# Patient Record
Sex: FEMALE | Race: WHITE | Employment: UNEMPLOYED | URBAN - NONMETROPOLITAN AREA
[De-identification: names, ages, dates, MRNs, and addresses within clinical notes are randomized per-mention and may not be internally consistent; named-entity substitution may affect disease eponyms.]

---

## 2022-05-28 ENCOUNTER — HOSPITAL ENCOUNTER (INPATIENT)
Age: 57
LOS: 3 days | Discharge: HOME | DRG: 885 | End: 2022-05-31
Attending: PSYCHIATRY & NEUROLOGY | Admitting: PSYCHIATRY & NEUROLOGY
Payer: MEDICARE

## 2022-05-28 PROBLEM — F34.9 PERSISTENT MOOD (AFFECTIVE) DISORDER, UNSPECIFIED (HCC): Status: ACTIVE | Noted: 2022-05-28

## 2022-05-28 PROBLEM — F23 ACUTE PSYCHOSIS (HCC): Status: ACTIVE | Noted: 2022-05-28

## 2022-05-28 LAB
ALBUMIN SERPL-MCNC: 3.9 G/DL (ref 3.5–5.2)
ALP BLD-CCNC: 104 U/L (ref 35–104)
ALT SERPL-CCNC: 9 U/L (ref 5–33)
ANION GAP SERPL CALCULATED.3IONS-SCNC: 7 MMOL/L (ref 7–19)
AST SERPL-CCNC: 13 U/L (ref 5–32)
BILIRUB SERPL-MCNC: 0.3 MG/DL (ref 0.2–1.2)
BUN BLDV-MCNC: 6 MG/DL (ref 6–20)
CALCIUM SERPL-MCNC: 9.1 MG/DL (ref 8.6–10)
CHLORIDE BLD-SCNC: 96 MMOL/L (ref 98–111)
CO2: 32 MMOL/L (ref 22–29)
CREAT SERPL-MCNC: 0.5 MG/DL (ref 0.5–0.9)
GFR AFRICAN AMERICAN: >59
GFR NON-AFRICAN AMERICAN: >60
GLUCOSE BLD-MCNC: 81 MG/DL (ref 74–109)
POTASSIUM SERPL-SCNC: 4.4 MMOL/L (ref 3.5–5)
SODIUM BLD-SCNC: 135 MMOL/L (ref 136–145)
TOTAL PROTEIN: 6 G/DL (ref 6.6–8.7)

## 2022-05-28 PROCEDURE — 80053 COMPREHEN METABOLIC PANEL: CPT

## 2022-05-28 PROCEDURE — 1240000000 HC EMOTIONAL WELLNESS R&B

## 2022-05-28 PROCEDURE — 6370000000 HC RX 637 (ALT 250 FOR IP): Performed by: FAMILY MEDICINE

## 2022-05-28 PROCEDURE — 6370000000 HC RX 637 (ALT 250 FOR IP): Performed by: PSYCHIATRY & NEUROLOGY

## 2022-05-28 PROCEDURE — 36415 COLL VENOUS BLD VENIPUNCTURE: CPT

## 2022-05-28 PROCEDURE — 90792 PSYCH DIAG EVAL W/MED SRVCS: CPT | Performed by: PSYCHIATRY & NEUROLOGY

## 2022-05-28 RX ORDER — LOSARTAN POTASSIUM 50 MG/1
50 TABLET ORAL ONCE
Status: COMPLETED | OUTPATIENT
Start: 2022-05-28 | End: 2022-05-28

## 2022-05-28 RX ORDER — LOSARTAN POTASSIUM 50 MG/1
50 TABLET ORAL DAILY
Status: DISCONTINUED | OUTPATIENT
Start: 2022-05-29 | End: 2022-05-29

## 2022-05-28 RX ORDER — LORATADINE 10 MG/1
10 TABLET ORAL DAILY
Status: ON HOLD | COMMUNITY
End: 2022-05-31 | Stop reason: HOSPADM

## 2022-05-28 RX ORDER — ACETAMINOPHEN 325 MG/1
650 TABLET ORAL EVERY 4 HOURS PRN
Status: DISCONTINUED | OUTPATIENT
Start: 2022-05-28 | End: 2022-05-31 | Stop reason: HOSPADM

## 2022-05-28 RX ORDER — TRAZODONE HYDROCHLORIDE 100 MG/1
100 TABLET ORAL NIGHTLY
Status: ON HOLD | COMMUNITY
End: 2022-05-31 | Stop reason: HOSPADM

## 2022-05-28 RX ORDER — CLONIDINE HYDROCHLORIDE 0.1 MG/1
0.1 TABLET ORAL 3 TIMES DAILY PRN
Status: DISCONTINUED | OUTPATIENT
Start: 2022-05-28 | End: 2022-05-31 | Stop reason: HOSPADM

## 2022-05-28 RX ORDER — FLUOXETINE HYDROCHLORIDE 40 MG/1
80 CAPSULE ORAL DAILY
Status: ON HOLD | COMMUNITY
End: 2022-05-31 | Stop reason: HOSPADM

## 2022-05-28 RX ORDER — QUETIAPINE FUMARATE 25 MG/1
50 TABLET, FILM COATED ORAL NIGHTLY
Status: ON HOLD | COMMUNITY
End: 2022-05-31 | Stop reason: HOSPADM

## 2022-05-28 RX ORDER — LOSARTAN POTASSIUM 100 MG/1
100 TABLET ORAL DAILY
Status: DISCONTINUED | OUTPATIENT
Start: 2022-05-28 | End: 2022-05-28

## 2022-05-28 RX ORDER — DICYCLOMINE HCL 20 MG
20 TABLET ORAL 3 TIMES DAILY PRN
Status: ON HOLD | COMMUNITY
End: 2022-05-31 | Stop reason: HOSPADM

## 2022-05-28 RX ORDER — QUETIAPINE FUMARATE 25 MG/1
25 TABLET, FILM COATED ORAL 3 TIMES DAILY
Status: ON HOLD | COMMUNITY
End: 2022-05-31 | Stop reason: HOSPADM

## 2022-05-28 RX ORDER — ERGOCALCIFEROL 1.25 MG/1
50000 CAPSULE ORAL WEEKLY
Status: ON HOLD | COMMUNITY
End: 2022-05-31 | Stop reason: HOSPADM

## 2022-05-28 RX ORDER — TRAZODONE HYDROCHLORIDE 50 MG/1
50 TABLET ORAL NIGHTLY
Status: DISCONTINUED | OUTPATIENT
Start: 2022-05-28 | End: 2022-05-31 | Stop reason: HOSPADM

## 2022-05-28 RX ORDER — OMEPRAZOLE 20 MG/1
20 CAPSULE, DELAYED RELEASE ORAL DAILY
Status: ON HOLD | COMMUNITY
End: 2022-05-31 | Stop reason: HOSPADM

## 2022-05-28 RX ORDER — HYDROXYZINE HYDROCHLORIDE 25 MG/1
25 TABLET, FILM COATED ORAL 3 TIMES DAILY PRN
Status: DISCONTINUED | OUTPATIENT
Start: 2022-05-28 | End: 2022-05-31 | Stop reason: HOSPADM

## 2022-05-28 RX ORDER — POLYETHYLENE GLYCOL 3350 17 G/17G
17 POWDER, FOR SOLUTION ORAL DAILY PRN
Status: DISCONTINUED | OUTPATIENT
Start: 2022-05-28 | End: 2022-05-31 | Stop reason: HOSPADM

## 2022-05-28 RX ORDER — LOSARTAN POTASSIUM 100 MG/1
50 TABLET ORAL DAILY
Status: ON HOLD | COMMUNITY
End: 2022-05-31 | Stop reason: HOSPADM

## 2022-05-28 RX ORDER — PANTOPRAZOLE SODIUM 40 MG/1
40 TABLET, DELAYED RELEASE ORAL
Status: DISCONTINUED | OUTPATIENT
Start: 2022-05-29 | End: 2022-05-31 | Stop reason: HOSPADM

## 2022-05-28 RX ORDER — NICOTINE 21 MG/24HR
1 PATCH, TRANSDERMAL 24 HOURS TRANSDERMAL DAILY
Status: DISCONTINUED | OUTPATIENT
Start: 2022-05-28 | End: 2022-05-31 | Stop reason: HOSPADM

## 2022-05-28 RX ORDER — SULFASALAZINE 500 MG/1
500 TABLET ORAL 2 TIMES DAILY
Status: ON HOLD | COMMUNITY
End: 2022-05-31 | Stop reason: HOSPADM

## 2022-05-28 RX ADMIN — TRAZODONE HYDROCHLORIDE 50 MG: 50 TABLET ORAL at 21:01

## 2022-05-28 RX ADMIN — ACETAMINOPHEN 650 MG: 325 TABLET ORAL at 21:01

## 2022-05-28 RX ADMIN — HYDROXYZINE HYDROCHLORIDE 25 MG: 25 TABLET, FILM COATED ORAL at 21:01

## 2022-05-28 RX ADMIN — DIVALPROEX SODIUM 750 MG: 500 TABLET, EXTENDED RELEASE ORAL at 21:01

## 2022-05-28 RX ADMIN — LOSARTAN POTASSIUM 100 MG: 100 TABLET, FILM COATED ORAL at 14:50

## 2022-05-28 RX ADMIN — HYDROXYZINE HYDROCHLORIDE 25 MG: 25 TABLET, FILM COATED ORAL at 12:45

## 2022-05-28 RX ADMIN — ACETAMINOPHEN 650 MG: 325 TABLET ORAL at 12:45

## 2022-05-28 RX ADMIN — CLONIDINE HYDROCHLORIDE 0.1 MG: 0.1 TABLET ORAL at 16:48

## 2022-05-28 RX ADMIN — LOSARTAN POTASSIUM 50 MG: 50 TABLET, FILM COATED ORAL at 21:01

## 2022-05-28 ASSESSMENT — PAIN DESCRIPTION - ONSET
ONSET: ON-GOING

## 2022-05-28 ASSESSMENT — PAIN DESCRIPTION - DESCRIPTORS
DESCRIPTORS: ACHING;DISCOMFORT;NAGGING
DESCRIPTORS: ACHING;DISCOMFORT
DESCRIPTORS: ACHING;DISCOMFORT
DESCRIPTORS: ACHING

## 2022-05-28 ASSESSMENT — PAIN DESCRIPTION - PAIN TYPE
TYPE: CHRONIC PAIN
TYPE: CHRONIC PAIN;ACUTE PAIN
TYPE: CHRONIC PAIN

## 2022-05-28 ASSESSMENT — PAIN SCALES - GENERAL
PAINLEVEL_OUTOF10: 10
PAINLEVEL_OUTOF10: 10
PAINLEVEL_OUTOF10: 8
PAINLEVEL_OUTOF10: 5
PAINLEVEL_OUTOF10: 10

## 2022-05-28 ASSESSMENT — PAIN DESCRIPTION - ORIENTATION
ORIENTATION: OTHER (COMMENT)
ORIENTATION: OTHER (COMMENT)

## 2022-05-28 ASSESSMENT — PAIN DESCRIPTION - LOCATION
LOCATION: OTHER (COMMENT)
LOCATION: OTHER (COMMENT)
LOCATION: GENERALIZED
LOCATION: OTHER (COMMENT)

## 2022-05-28 ASSESSMENT — PAIN - FUNCTIONAL ASSESSMENT
PAIN_FUNCTIONAL_ASSESSMENT: ACTIVITIES ARE NOT PREVENTED
PAIN_FUNCTIONAL_ASSESSMENT: ACTIVITIES ARE NOT PREVENTED
PAIN_FUNCTIONAL_ASSESSMENT: PREVENTS OR INTERFERES SOME ACTIVE ACTIVITIES AND ADLS
PAIN_FUNCTIONAL_ASSESSMENT: ACTIVITIES ARE NOT PREVENTED

## 2022-05-28 ASSESSMENT — PAIN DESCRIPTION - FREQUENCY
FREQUENCY: CONTINUOUS

## 2022-05-28 ASSESSMENT — PATIENT HEALTH QUESTIONNAIRE - PHQ9: SUM OF ALL RESPONSES TO PHQ QUESTIONS 1-9: 14

## 2022-05-28 ASSESSMENT — SLEEP AND FATIGUE QUESTIONNAIRES
DO YOU USE A SLEEP AID: YES
AVERAGE NUMBER OF SLEEP HOURS: 8
SLEEP PATTERN: RESTFUL
DO YOU HAVE DIFFICULTY SLEEPING: NO

## 2022-05-28 NOTE — H&P
History and Physical      CHIEF COMPLAINT:  Depression    Reason for Admission:  Depression    History Obtained From:  Patient, chart    HISTORY OF PRESENT ILLNESS:      The patient is a 61 y.o. female who was admitted to [facility] with worsening mood issues. She has no c/o CP or SOA. No abdominal pain or N/V. No dysuria. No new pain complaints. No fevers. No HA or dizziness. Past Medical History:    No past medical history on file. Past Surgical History:    No past surgical history on file. Medications Prior to Admission:    No medications prior to admission. Allergies:  Patient has no known allergies. Social History:   TOBACCO:   has no history on file for tobacco use. ETOH:   has no history on file for alcohol use. DRUGS:   has no history on file for drug use. MARITAL STATUS: not   OCCUPATION:  Not working  Patient currently lives with BF      Family History:   No family history on file. REVIEW OF SYSTEMS:  Constitutional: neg  CV: neg  Pulmonary: neg  GI: neg  : neg  Psych: depression  Neuro: neg  Skin: neg  MusculoSkeletal: neg  HEENT: neg  Joints: neg  Vitals:  BP (!) 143/105   Pulse 78   Temp 97.3 °F (36.3 °C) (Temporal)   Resp 18   SpO2 91%     PHYSICAL EXAM:  Gen: NAD, alert, pleasant  HEENT: WNL  Lymph: no LAD  Neck: no JVD or masses  Chest: CTA bilat  CV: RRR  Abdomen: NT/ND  Extrem: no C/C/E  Neuro: non focal  Skin: no rashes  Joints: no redness  DATA:  I have reviewed the admission labs and imaging tests.     ASSESSMENT AND PLAN:      Principal Problem:    Persistent mood (affective) disorder, unspecified---follow with Psych    COPD---supportive care    HTN---follow BP        Ced Cortez MD  1:01 PM 5/28/2022

## 2022-05-28 NOTE — PROGRESS NOTES
[facility] Admission Note  Nursing Admission Note        Reason for Admission: Pt is a 61year old white female who was directly admitted to [address] from [facility]. Pt reported visual hallucinations of  sister (but then reports sister is not actually ) and \"thought she was crazy\". Pt confused of time and place. Pt has a history of MDD, PTSD and cannabis use. Patient Active Problem List   Diagnosis    Acute psychosis ([facility] [address]    Persistent mood (affective) disorder, unspecified (HCC)         Addictive Behavior:   Addictive Behavior  In the Past 3 Months, Have You Felt or Has Someone Told You That You Have a Problem With  : None    Medical Problems:   No past medical history on file. Status EXAM:  Mental Status and Behavioral Exam  Normal: No  Level of Assistance: Independent/Self  Facial Expression: Exaggerated,Elevated  Affect: Congruent  Level of Consciousness: Alert  Frequency of Checks: 4 times per hour, close  Mood:Normal: No  Mood: Depressed,Anxious,Helpless,Worthless, low self-esteem  Motor Activity:Normal: Yes  Eye Contact: Fair  Observed Behavior: Cooperative,Tearful,Friendly  Sexual Misconduct History: Current - no  Preception: [place] to person,[place] to time,[place] to place,[place] to situation  Attention:Normal: No  Attention: Distractible  Thought Processes: Circumstantial  Thought Content:Normal: Yes  Depression Symptoms: Feelings of hopelessess,Feelings of worthlessness,Feelings of helplessness,Impaired concentration,Change in energy level,Loss of interest (rates depression 10)  Anxiety Symptoms: Generalized (rates anxiety 10)  Lizeth Symptoms: No problems reported or observed.   Hallucinations: None  Delusions: Yes  Delusions: Paranoid  Memory:Normal: Yes  Insight and Judgment: No  Insight and Judgment:  (limited insight and judgment)      Metabolic Screening:    No results found for: LABA1C  No results found for: CHOL  No results found for: TRIG  No results found for: HDL  No components found for: LDLCAL  No results found for: LABVLDL    There is no height or weight on file to calculate BMI. BP Readings from Last 2 Encounters:   05/28/22 (!) 143/105       PATIENT STRENGTHS:       Patient Strengths and Limitations:         Tobacco Screening:  Practical Counseling, on admission, doyle X, if applicable and completed (first 3 are required if patient doesn't refuse):            Recognizing danger situations (included triggers and roadblocks)                 Coping skills (new ways to manage stress, exercise, relaxation techniques, changing routine, distraction                                                      Basic information about quitting (benefits of quitting, techniques in how to quit, available resources   Referral for counseling [facility]ed to [facility]                                            Patient refused counseling yes  Patient has not smoked in the last 30 days   Patient offered nicotine patch. Received yes  Refused   Patient is a non-smoker                                   Admission to Unit:    Pt admitted to [facility] under the care of Dr. Bria Jackson,  arrived on unit via stretcher with security and EMS from [facility]. Patient arrived dressed in paper scrubs:  no.    Body assessment and safety check completed by Jarred John RN and Taiwo Pisano RN and  no contraband discovered. No belongings or valuables. Admission completed by Jarred John RN. Oriented to unit, unit policy and expectations:  yes    Reviewed and explained all legal documents:  yes    Education for Fall Prevention and Restraints given: yes    Patient signed all legal documents voluntary signed   Pt verbalizes understanding:yes     Viraj Madrigal Obtained? yes    Medical Bed:  Does patient require a medical bed? yes  If answered yes for medical bed use, does the patient have the following conditions? High risk for falls? yes   Obstructive sleep apnea? no   Oxygen Use? no   Use of assistive devices? no   Other, (explain)? COPD      Identifies stressors. yes   Family relationship . Protective Factors:  Patient identifies protective factors with nursing staff as follows: Identifies reasons for living: Yes   Supportive Social Network or family: Yes    Belief that suicide is immoral/high spirituality: Yes   Responsibility to family or others/living with family: Yes   Fear of death or dying due to pain and suffering: No   Engaged in work or school: No     If Patient is unable to identify, reason why? COVID TEACHING:   Nursing provided education regarding COVID for social distancing, wearing masks, washing hands, and reporting any symptoms: yes  Mask Provided: yes If patient refused, reason: pt's on unit have negative COVID test prior to admission. Admission Note:    Pt arrived on unit via stretcher with EMS and security. Direct admission from [facility] [employer]. Oriented to unit. Will continue to monitor.      Electronically signed by Guille Maria RN on 5/28/22 at 2:47 PM CDT

## 2022-05-28 NOTE — H&P
Department of Psychiatry  Attending History and Physical        CHIEF COMPLAINT:  \"I've been doing terrible\"    History obtained from: patient, chart    HISTORY OF PRESENT ILLNESS:    62 yo white female brought in from [facility] with SI. New to our service. Patient is visibly distressed when seen today. She is tearful. Her speech is loud and pressured. States she has been severely depressed due to her social stressors. Reports severe mood swings and racing thoughts. States her yadiel has been calling her crazy. She denies suicidal ideation at this time. She reports poor sleep. She denies hallucinations and paranoia. She is open to medication adjustment. PSYCHIATRIC HISTORY:    Diagnoses: Depression  Suicide attempts/gestures: Denies   Prior hospitalizations: several  Medication trials: Unable to recall  Mental health contact: Lost to follow-up   Head trauma: Denies    SUBSTANCE USE HISTORY:  Drinks socially. Smokes cannabis daily. Smokes cigarettes. Past Medical History:    No past medical history on file. Past Surgical History:    No past surgical history on file. Medications Prior to Admission:   Prior to Admission medications    Not on File       Allergies:  Patient has no allergy information on record. Social History:  Lives with sherlyn. Family History:   No family history on file. No history of psychiatric illness or suicide attempts. REVIEW OF SYSTEMS:  General: No fevers, chills, night sweats, no recent weight loss or gain. Head: No headache, no migraine. Eyes: No recent visual changes. Ears: No recent hearing changes. Nose: No increased congestion or change in sense of smell. Throat: No sore throat, no trouble swallowing. Cardiovascular: No chest pain or palpitations, or dizziness. Respiratory: No cough, wheezes, congestion, or shortness of breath. Gastrointestinal: No abdominal pain, nausea or vomiting, no diarrhea or constipation.    Musculo-skeletal: No edema, deformities, or loss of functions. Neurological: No loss of consciousness, abnormal sensations, or weakness. Skin: No rash, abrasions or bruises. PHYSICAL EXAM:  GENERAL APPEARANCE: 61y.o. year-old female in NAD   HEAD: Normocephalic, atraumatic. THROAT: No erythema, exudates, lesions. No tongue laceration. CARDIOVASCULAR: PMI nondisplaced. Regular rhythm and rate. Normal S1 and S2.  PULMONARY: Clear to auscultation bilaterally, no tenderness to palpation. ABDOMEN: Soft, nontender, nondistended. MUSCULOSKELTAL: No obvious deformities, clubbing, cyanosis or edema, no spinous process or paraspinous tenderness, normal ROM, distal pulses intact symmetric 2+ bilaterally. NEUROLOGICAL: Alert, oriented x 3, CN II-XII grossly intact, motor strength 5/5 all muscle groups, DTR 2+ intact and symmetric, sensation intact to sharp and dull. No abnormal movements or tremors. SKIN: Warm, dry, intact, no rash, abrasions bruises     Vitals:  BP (!) 143/105   Pulse 78   Temp 97.3 °F (36.3 °C) (Temporal)   Resp 18   SpO2 91%     Mental Status Examination:    Appearance: Stated age. Disheveled. Gait stable. No abnormal movements or tremor. Behavior: Tearful, restless  Speech: Pressured  Mood: \"I feel crazy \"   Affect: Labile   Thought Process: Appears linear. Thought Content: Denies SI/HI. No overt delusions or paranoia appreciated. Perceptions: Denies auditory or visual hallucinations at present time. Not responding to internal stimuli. Concentration: Intact. Orientation: to person, place, date, and situation. Language: Intact. Fund of information: Intact. Memory: Recent and remote appear intact. Neurovegitative: Poor appetite and sleep. Insight: Impaired. Judgment: Impaired.     DATA:  No results found for: WBC, HGB, HCT, MCV, PLT  No results found for: NA, K, CL, CO2, BUN, CREATININE, GLUCOSE, CALCIUM, PROT, LABALBU, BILITOT, ALKPHOS, AST, ALT, LABGLOM, GFRAA, AGRATIO, GLOB        ASSESSMENT AND PLAN:  DSM-5 DIAGNOSIS:   Impression:  Mood disorder unspecified  Anxiety unspecified  Insomnia unspecified  Cannabis use severe  Tobacco use disorder    Patient is meeting the criteria for inpatient psychiatric treatment. Plan:   -Admit to [facility] Unit and monitor on 15 minute checks. Suicide and fall precautions.  Yara Hutchinson reviewed. -Gather collateral information from family with release.  -Medical monitoring to be performed by Dr. Jesús Beck and associates. Order routine labs. -Acclimate to the unit. Provide supportive psychotherapy.  -Encourage participation in groups and therapeutic activities as appropriate. Work on coping skills. -Medications:    A trial of Depakote for mood stabilization. Trazodone for sleep.    Offer nicotine patch to help with nicotine withdrawal.    -The risks, benefits, side effects, indications, contraindications, and adverse effects of the medications have been discussed.  -The patient has verbalized understanding and has capacity to give informed consent.  -SW help evaluate home environment and provide outpatient resources.  -Discuss with treatment team.

## 2022-05-28 NOTE — PROGRESS NOTES
Behavioral Services  Medicare Certification Upon Admission    I certify that this patient's inpatient psychiatric hospital admission is medically necessary for:    [x] (1) Treatment which could reasonably be expected to improve this patient's condition,       [] (2) Or for diagnostic study;     AND     [x](2) The inpatient psychiatric services are provided while the individual is under the care of a physician and are included in the individualized plan of care.     Estimated length of stay/service 3-5 days  Plan for post-hospital care TBA    Electronically signed by Nirmal Reddy MD on 5/28/2022 at 11:06 AM

## 2022-05-29 LAB
ALBUMIN SERPL-MCNC: 4.1 G/DL (ref 3.5–5.2)
ALP BLD-CCNC: 107 U/L (ref 35–104)
ALT SERPL-CCNC: 9 U/L (ref 5–33)
ANION GAP SERPL CALCULATED.3IONS-SCNC: 10 MMOL/L (ref 7–19)
AST SERPL-CCNC: 12 U/L (ref 5–32)
BASOPHILS ABSOLUTE: 0.1 K/UL (ref 0–0.2)
BASOPHILS RELATIVE PERCENT: 1.4 % (ref 0–1)
BILIRUB SERPL-MCNC: 0.4 MG/DL (ref 0.2–1.2)
BUN BLDV-MCNC: 5 MG/DL (ref 6–20)
CALCIUM SERPL-MCNC: 9.3 MG/DL (ref 8.6–10)
CHLORIDE BLD-SCNC: 97 MMOL/L (ref 98–111)
CHOLESTEROL, TOTAL: 187 MG/DL (ref 160–199)
CO2: 29 MMOL/L (ref 22–29)
CREAT SERPL-MCNC: 0.5 MG/DL (ref 0.5–0.9)
EOSINOPHILS ABSOLUTE: 0.4 K/UL (ref 0–0.6)
EOSINOPHILS RELATIVE PERCENT: 6.4 % (ref 0–5)
GFR AFRICAN AMERICAN: >59
GFR NON-AFRICAN AMERICAN: >60
GLUCOSE BLD-MCNC: 95 MG/DL (ref 74–109)
HBA1C MFR BLD: 5.7 % (ref 4–6)
HCT VFR BLD CALC: 50.1 % (ref 37–47)
HDLC SERPL-MCNC: 51 MG/DL (ref 65–121)
HEMOGLOBIN: 16 G/DL (ref 12–16)
IMMATURE GRANULOCYTES #: 0 K/UL
LDL CHOLESTEROL CALCULATED: 118 MG/DL
LYMPHOCYTES ABSOLUTE: 1.4 K/UL (ref 1.1–4.5)
LYMPHOCYTES RELATIVE PERCENT: 23.5 % (ref 20–40)
MCH RBC QN AUTO: 28.1 PG (ref 27–31)
MCHC RBC AUTO-ENTMCNC: 31.9 G/DL (ref 33–37)
MCV RBC AUTO: 87.9 FL (ref 81–99)
MONOCYTES ABSOLUTE: 0.6 K/UL (ref 0–0.9)
MONOCYTES RELATIVE PERCENT: 10.2 % (ref 0–10)
NEUTROPHILS ABSOLUTE: 3.4 K/UL (ref 1.5–7.5)
NEUTROPHILS RELATIVE PERCENT: 58 % (ref 50–65)
PDW BLD-RTO: 14.6 % (ref 11.5–14.5)
PLATELET # BLD: 416 K/UL (ref 130–400)
PMV BLD AUTO: 10.1 FL (ref 9.4–12.3)
POTASSIUM SERPL-SCNC: 4.7 MMOL/L (ref 3.5–5)
RBC # BLD: 5.7 M/UL (ref 4.2–5.4)
SODIUM BLD-SCNC: 136 MMOL/L (ref 136–145)
TOTAL PROTEIN: 6.2 G/DL (ref 6.6–8.7)
TRIGL SERPL-MCNC: 88 MG/DL (ref 0–149)
TSH REFLEX FT4: 1.63 UIU/ML (ref 0.35–5.5)
VITAMIN B-12: 493 PG/ML (ref 211–946)
VITAMIN D 25-HYDROXY: 41.9 NG/ML
WBC # BLD: 5.8 K/UL (ref 4.8–10.8)

## 2022-05-29 PROCEDURE — 80061 LIPID PANEL: CPT

## 2022-05-29 PROCEDURE — 82306 VITAMIN D 25 HYDROXY: CPT

## 2022-05-29 PROCEDURE — 6360000002 HC RX W HCPCS: Performed by: PSYCHIATRY & NEUROLOGY

## 2022-05-29 PROCEDURE — 83036 HEMOGLOBIN GLYCOSYLATED A1C: CPT

## 2022-05-29 PROCEDURE — 80053 COMPREHEN METABOLIC PANEL: CPT

## 2022-05-29 PROCEDURE — 1240000000 HC EMOTIONAL WELLNESS R&B

## 2022-05-29 PROCEDURE — 84443 ASSAY THYROID STIM HORMONE: CPT

## 2022-05-29 PROCEDURE — 36415 COLL VENOUS BLD VENIPUNCTURE: CPT

## 2022-05-29 PROCEDURE — 85025 COMPLETE CBC W/AUTO DIFF WBC: CPT

## 2022-05-29 PROCEDURE — 6370000000 HC RX 637 (ALT 250 FOR IP): Performed by: PSYCHIATRY & NEUROLOGY

## 2022-05-29 PROCEDURE — 82607 VITAMIN B-12: CPT

## 2022-05-29 PROCEDURE — 6370000000 HC RX 637 (ALT 250 FOR IP): Performed by: FAMILY MEDICINE

## 2022-05-29 PROCEDURE — 94640 AIRWAY INHALATION TREATMENT: CPT

## 2022-05-29 RX ORDER — ALBUTEROL SULFATE 2.5 MG/3ML
2.5 SOLUTION RESPIRATORY (INHALATION) 4 TIMES DAILY PRN
Status: DISCONTINUED | OUTPATIENT
Start: 2022-05-29 | End: 2022-05-31 | Stop reason: HOSPADM

## 2022-05-29 RX ORDER — AMLODIPINE BESYLATE 5 MG/1
2.5 TABLET ORAL DAILY
Status: DISCONTINUED | OUTPATIENT
Start: 2022-05-29 | End: 2022-05-31 | Stop reason: HOSPADM

## 2022-05-29 RX ORDER — LOSARTAN POTASSIUM 100 MG/1
100 TABLET ORAL DAILY
Status: DISCONTINUED | OUTPATIENT
Start: 2022-05-30 | End: 2022-05-31 | Stop reason: HOSPADM

## 2022-05-29 RX ADMIN — DIVALPROEX SODIUM 750 MG: 500 TABLET, EXTENDED RELEASE ORAL at 20:35

## 2022-05-29 RX ADMIN — LOSARTAN POTASSIUM 50 MG: 50 TABLET, FILM COATED ORAL at 08:15

## 2022-05-29 RX ADMIN — ACETAMINOPHEN 650 MG: 325 TABLET ORAL at 20:34

## 2022-05-29 RX ADMIN — AMLODIPINE BESYLATE 2.5 MG: 5 TABLET ORAL at 12:10

## 2022-05-29 RX ADMIN — CLONIDINE HYDROCHLORIDE 0.1 MG: 0.1 TABLET ORAL at 09:29

## 2022-05-29 RX ADMIN — PANTOPRAZOLE SODIUM 40 MG: 40 TABLET, DELAYED RELEASE ORAL at 05:48

## 2022-05-29 RX ADMIN — ALBUTEROL SULFATE 2.5 MG: 2.5 SOLUTION RESPIRATORY (INHALATION) at 18:30

## 2022-05-29 RX ADMIN — CLONIDINE HYDROCHLORIDE 0.1 MG: 0.1 TABLET ORAL at 17:45

## 2022-05-29 RX ADMIN — ACETAMINOPHEN 650 MG: 325 TABLET ORAL at 06:36

## 2022-05-29 RX ADMIN — HYDROXYZINE HYDROCHLORIDE 25 MG: 25 TABLET, FILM COATED ORAL at 20:35

## 2022-05-29 RX ADMIN — TRAZODONE HYDROCHLORIDE 50 MG: 50 TABLET ORAL at 20:34

## 2022-05-29 RX ADMIN — CLONIDINE HYDROCHLORIDE 0.1 MG: 0.1 TABLET ORAL at 05:57

## 2022-05-29 ASSESSMENT — PAIN DESCRIPTION - ONSET
ONSET: ON-GOING

## 2022-05-29 ASSESSMENT — PAIN DESCRIPTION - DESCRIPTORS
DESCRIPTORS: ACHING;DISCOMFORT
DESCRIPTORS: ACHING;DISCOMFORT
DESCRIPTORS: ACHING;THROBBING
DESCRIPTORS: ACHING;DISCOMFORT

## 2022-05-29 ASSESSMENT — PAIN DESCRIPTION - FREQUENCY
FREQUENCY: CONTINUOUS

## 2022-05-29 ASSESSMENT — PAIN DESCRIPTION - PAIN TYPE
TYPE: CHRONIC PAIN

## 2022-05-29 ASSESSMENT — PAIN DESCRIPTION - LOCATION
LOCATION: OTHER (COMMENT)
LOCATION: BACK;OTHER (COMMENT)
LOCATION: HEAD;OTHER (COMMENT)
LOCATION: OTHER (COMMENT)

## 2022-05-29 ASSESSMENT — PAIN DESCRIPTION - ORIENTATION
ORIENTATION: OTHER (COMMENT)
ORIENTATION: LOWER

## 2022-05-29 ASSESSMENT — PAIN SCALES - GENERAL
PAINLEVEL_OUTOF10: 10
PAINLEVEL_OUTOF10: 10
PAINLEVEL_OUTOF10: 7

## 2022-05-29 NOTE — PLAN OF CARE
Problem: Self Harm/Suicidality  Goal: Will have no self-injury during hospital stay  Description: INTERVENTIONS:  1. Q 30 MINUTES: Routine safety checks  2. Q SHIFT & PRN: Assess risk to determine if routine checks are adequate to maintain patient safety  5/29/2022 0247 by Alan Hebert RN  Outcome: Progressing  5/29/2022 0110 by Alan Hebert RN  Outcome: Progressing     Problem: Depression  Goal: Will be euthymic at discharge  Description: INTERVENTIONS:  1. Administer medication as ordered  2. Provide emotional support via 1:1 interaction with staff  3. Encourage involvement in milieu/groups/activities  4. Monitor for social isolation  5/29/2022 0247 by Alan Hebert RN  Outcome: Progressing  5/29/2022 0110 by Alan Hebert RN  Outcome: Progressing     Problem: Psychosis  Goal: Will report no hallucinations or delusions  Description: INTERVENTIONS:  1. Administer medication as  ordered  2. Assist with reality testing to support increasing orientation  3. Assess if patient's hallucinations or delusions are encouraging self harm or harm to others and intervene as appropriate  5/29/2022 0247 by Alan Hebert RN  Outcome: Progressing  5/29/2022 0110 by Alan Hebert RN  Outcome: Progressing     Problem: Behavior  Goal: Pt/Family maintain appropriate behavior and adhere to behavioral management agreement, if implemented  Description: INTERVENTIONS:  1. Assess patient/family's coping skills and  non-compliant behavior (including use of illegal substances)  2. Notify security of behavior or suspected illegal substances which indicate the need for search of the patient and/or belongings  3. Encourage verbalization of thoughts and concerns in a socially appropriate manner  4. Utilize positive, consistent limit setting strategies supporting safety of patient, staff and others  5. Encourage participation in the decision making process about the behavioral management agreement  6.  Implement a Health Care Agreement if patient meets criteria  7. If a patient's behavior jeopardizes the safety of the patient, staff, or others refer to organization policy. If a visitor's behavior poses a threat to safety call refer to organization policy. 8. Initiate consult with , Psychosocial CNS, Spiritual Care as appropriate  5/29/2022 0247 by Quoc Candelaria RN  Outcome: Progressing  5/29/2022 0110 by Quoc Candelaria RN  Outcome: Progressing  Flowsheets (Taken 5/28/2022 1416 by Lacey Giraldo RN)  Patient/family maintains appropriate behavior and adheres to behavioral management agreement, if implemented:   Assess patient/familys coping skills and  non-compliant behavior (including use of illegal substances)   Encourage participation in the decision making process about the behavioral management agreement     Problem: Anxiety  Goal: Will report anxiety at manageable levels  Description: INTERVENTIONS:  1. Administer medication as ordered  2. Teach and rehearse alternative coping skills  3. Provide emotional support with 1:1 interaction with staff  5/29/2022 0247 by Quoc Candelaria RN  Outcome: Progressing  5/29/2022 0110 by Quoc Candelaria RN  Outcome: Progressing  Flowsheets (Taken 5/28/2022 1416 by Lacey Giraldo RN)  Will report anxiety at manageable levels:   Administer medication as ordered   Provide emotional support with 1:1 interaction with staff   Teach and rehearse alternative coping skills     Problem: Sleep Disturbance  Goal: Will exhibit normal sleeping pattern  Description: INTERVENTIONS:  1. Administer medication as ordered  2. Decrease environmental stimuli, including noise, as appropriate  3. Discourage social isolation and naps during the day  5/29/2022 0247 by Quoc Candelaria RN  Outcome: Progressing  5/29/2022 0110 by Quoc Candelaria RN  Outcome: Progressing     Problem: Self Care Deficit  Goal: Return ADL status to a safe level of function  Description: INTERVENTIONS:  1.  Administer medication as ordered  2. Assess ADL deficits and provide assistive devices as needed  3. Obtain PT/OT consults as needed  4.  Assist and instruct patient to increase activity and self care as tolerated  5/29/2022 0247 by Viri Ball RN  Outcome: Progressing  5/29/2022 0110 by Viri Ball RN  Outcome: Progressing     Problem: Pain  Goal: Verbalizes/displays adequate comfort level or baseline comfort level  5/29/2022 0247 by Viri Ball RN  Outcome: Progressing  5/29/2022 0110 by Viri Ball RN  Outcome: Progressing     Problem: Safety - Adult  Goal: Free from fall injury  5/29/2022 0247 by Viri Ball RN  Outcome: Progressing  5/29/2022 0110 by Viri Ball RN  Outcome: Progressing

## 2022-05-29 NOTE — PSYCHOTHERAPY
WRAP UP GROUP NOTE    Patient's Goal:  Providing feedback as to their own progress in the care-plan provided. Patients have an opportunity to explore self-reflective skills and share any additional cares and concerns not yet addressed. Pt effectively participated.     Energy Level:low  Appetite:poor  Concentration:poor  Hallucinations:\"no\"  Depression:worse  Anxiety:on/off  How I worked today:blank  What helps me sleep:\"meds\"  Any questions/complaints/comments:\"no\"    Group/activities that helped me today were:    Agnieszka    Assisted by; Tea Eli RN    Electronically signed by Kermit Sandifer, RN on 5/29/2022 at 2:20 AM

## 2022-05-29 NOTE — PLAN OF CARE
setting strategies supporting safety of patient, staff and others  5. Encourage participation in the decision making process about the behavioral management agreement  6. Implement a Health Care Agreement if patient meets criteria  7. If a patient's behavior jeopardizes the safety of the patient, staff, or others refer to organization policy. If a visitor's behavior poses a threat to safety call refer to organization policy. 8. Initiate consult with , Psychosocial CNS, Spiritual Care as appropriate  5/29/2022 0954 by Corona Humphreys RN  Outcome: Progressing  5/29/2022 [address] by Kevan Higginbotham RN  Outcome: Progressing  5/29/2022 0110 by Kevan Higginbotham RN  Outcome: Progressing  Flowsheets (Taken 5/28/2022 1416 by Corona Humphreys RN)  Patient/family maintains appropriate behavior and adheres to behavioral management agreement, if implemented:   Assess patient/familys coping skills and  non-compliant behavior (including use of illegal substances)   Encourage participation in the decision making process about the behavioral management agreement     Problem: Anxiety  Goal: Will report anxiety at manageable levels  Description: INTERVENTIONS:  1. Administer medication as ordered  2. Teach and rehearse alternative coping skills  3. Provide emotional support with 1:1 interaction with staff  5/29/2022 0954 by Corona Humphreys RN  Outcome: Progressing  5/29/2022 0247 by Kevan Higginbotham RN  Outcome: Progressing  5/29/2022 0110 by Kevan Higginbotham RN  Outcome: Progressing  Flowsheets (Taken 5/28/2022 1416 by Corona Humphreys RN)  Will report anxiety at manageable levels:   Administer medication as ordered   Provide emotional support with 1:1 interaction with staff   Teach and rehearse alternative coping skills     Problem: Sleep Disturbance  Goal: Will exhibit normal sleeping pattern  Description: INTERVENTIONS:  1. Administer medication as ordered  2.  Decrease environmental stimuli, including noise, as appropriate  3. Discourage social isolation and naps during the day  5/29/2022 0954 by Jae Musa RN  Outcome: Progressing  5/29/2022 0247 by Niranjan Flores RN  Outcome: Progressing  5/29/2022 0110 by Niranjan Flores RN  Outcome: Progressing     Problem: Self Care Deficit  Goal: Return ADL status to a safe level of function  Description: INTERVENTIONS:  1. Administer medication as ordered  2. Assess ADL deficits and provide assistive devices as needed  3. Obtain PT/OT consults as needed  4.  Assist and instruct patient to increase activity and self care as tolerated  5/29/2022 0954 by Jae Musa RN  Outcome: Progressing  5/29/2022 0247 by Niranjan Flores RN  Outcome: Progressing  5/29/2022 0110 by Niranjan Flores RN  Outcome: Progressing     Problem: Pain  Goal: Verbalizes/displays adequate comfort level or baseline comfort level  5/29/2022 0954 by Jae Musa RN  Outcome: Progressing  5/29/2022 0247 by Niranjan Flores RN  Outcome: Progressing  5/29/2022 0110 by Niranjan Flores RN  Outcome: Progressing     Problem: Safety - Adult  Goal: Free from fall injury  5/29/2022 0954 by Jae Musa RN  Outcome: Progressing  5/29/2022 0247 by Niranjan Flores RN  Outcome: Progressing  5/29/2022 0110 by Niranjan Flores RN  Outcome: Progressing

## 2022-05-29 NOTE — PROGRESS NOTES
Call placed to DR. Gilma Gamez. Dr. Gilma Gamez orders a now dose of Losartan 50mg and and losartan 50mg to start daily tomorrow. Will execute as received.      Electronically signed by Quoc Candelaria RN on 5/28/2022 at 7:47 PM

## 2022-05-29 NOTE — PSYCHOTHERAPY
[facility] Daily Shift Assessment-Geriatric Unit  Nursing Progress Note          Room: [address] [place] [address]   Name: Caren Garcia   Age: 61 y.o. Gender: female   Dx: Acute psychosis ([address]  Precautions: suicide risk and fall risk  Inpatient Status: voluntary     SLEEP:    Sleep: Yes,   Sleep Quality Good   Hours Slept:    Sleep Medications: Yes;trazadone 50  PRN Sleep Meds: No       MEDICAL:      Other PRN Meds: Yes; tylenol 650mg, atarax 25mg   Med Compliant: Yes   Accu-Chek: No   Oxygen/CPAP/BiPAP: No  CIWA/CINA: No   PAIN Assessment: receiving treatment  Side Effects from medication: none voiced    COVID TEACHING:    Is Patient experiencing any respiratory symptoms (headache, fever, body aches, cough. Shaaron Money ): no  Patient educated by nursing to practice social distancing, wear masks, wash hands frequently: yes    Medical Bed:   Is patient in a medical bed? yes   If medical bed is in use, has nursing secured room while patient is awake and out of the room? yes  Has safety checks by nursing been completed on the bed/room this shift? yes    Protective Factors:    Patient identifies protective factors with nursing staff as follows: Identifies reasons for living: Yes   Supportive Social Network or family: Yes    Belief that suicide is immoral/high spirituality: Yes   Responsibility to family or others/living with family: Yes   Fear of death or dying due to pain and suffering: No   Engaged in work or school: No     If Patient is unable to identify, reason why? N/A      Metabolic Screening:    No results found for: LABA1C    No results found for: CHOL  No results found for: TRIG  No results found for: HDL  No components found for: LDLCAL  No results found for: LABVLDL      There is no height or weight on file to calculate BMI.     BP Readings from Last 2 Encounters:   05/28/22 (!) 158/89         PSYCH:     SI denies suicidal ideation    HI Negative for homicidal ideation        AVH:denies      Depression: 6 Anxiety: 4 GENERAL:      Appetite: no change from normal  Percent Meals: no meals consumed   Social: No Speech: normal   Appearance:appropriately dressed  Assistive Devices: noneLevel of Assist: Independent      GROUP:    Group Participation: Yes  Participation LevelMinimal    Participation QualityAppropriate    Notes: Pt has been isolated to her room tonight. Her facial expression is sad. She has had c/o feeling dizzy. Spent most of shift in bed resting. BP elevated. 158/89 [id number]. Administered losartan 50mg once dose as ordered. Pt has wheelchair at bedside. Has not used this shift. Staff standby assist x1 to restroom for safety. Bed alarm activated for safety. PO fluids restricted as ordered. Pt has had 240ml's over this shift. She has been med compliant and is dressed appropriately. Pt rated her depression a 6 and her anxiety a 4. She denies SI, HI, and AVH. Will continue to monitor for safety as ordered.      Electronically signed by Joseph Portillo RN on 5/29/2022 at 1:44 AM

## 2022-05-29 NOTE — PROGRESS NOTES
Admission Note      Reason for admission/Target Symptom: Patient was a direct admit from the [facility] unit at [facility] and was admitted to [facility] due to visual hallucinations of  sister (but then reports sister is not actually ). Pt confused of time and place. She states that she has been severely depressed due to her social stressors. Reports severe mood swings and racing thoughts. States her fiancé has been calling her crazy. She denies suicidal ideation at this time, but she reports poor sleep. She is open to medication adjustment. Pt has a history of MDD, PTSD and cannabis use. Diagnoses: Persistent Mood (Affective) Disorder, unspecified; Acute Psychosis    UDS: NONE LISTED IN CHART  BAL: NONE LISTED IN CHART     SW met with treatment team to discuss patient's treatment including care planning, discharge planning, and follow-up needs. Pt has been admitted to [facility]. Treatment team has identified patient's discharge needs as medication management and outpatient therapy/counseling. Pt confirmed  the need for ongoing treatment post inpatient stay. Pt was also provided a handout of contact information for drug and alcohol treatment centers and other community support service such as [facility], AA, and [facility].

## 2022-05-29 NOTE — PROGRESS NOTES
[facility] Daily Shift Assessment-Geriatric Unit  Nursing Progress Note          Room: [address] [place] [address]   Name: Julianne Monroy   Age: 61 y.o. Gender: female   Dx: Acute psychosis ([address]  Precautions: suicide risk and fall risk  Inpatient Status: voluntary     SLEEP:    Sleep: Yes,   Sleep Quality Good   Hours Slept:    Sleep Medications: Yes  PRN Sleep Meds: No       MEDICAL:      Other PRN Meds: Yes   Med Compliant: Yes   Accu-Chek: No   Oxygen/CPAP/BiPAP: No  CIWA/CINA: No   PAIN Assessment: level of pain (1-10, 10 severe), 10  Side Effects from medication: No    COVID TEACHING:    Is Patient experiencing any respiratory symptoms (headache, fever, body aches, cough. Heddy  ): no  Patient educated by nursing to practice social distancing, wear masks, wash hands frequently: yes    Medical Bed:   Is patient in a medical bed? yes   If medical bed is in use, has nursing secured room while patient is awake and out of the room? yes  Has safety checks by nursing been completed on the bed/room this shift? yes    Protective Factors:    Patient identifies protective factors with nursing staff as follows: Identifies reasons for living: Yes   Supportive Social Network or family: Yes    Belief that suicide is immoral/high spirituality: Yes   Responsibility to family or others/living with family: Yes   Fear of death or dying due to pain and suffering: No   Engaged in work or school: No     If Patient is unable to identify, reason why? Metabolic Screening:    Lab Results   Component Value Date    LABA1C 5.7 05/29/2022       Lab Results   Component Value Date    CHOL 187 05/29/2022     Lab Results   Component Value Date    TRIG 88 05/29/2022     Lab Results   Component Value Date    HDL 51 (L) 05/29/2022     No components found for: LDLCAL  No results found for: LABVLDL      There is no height or weight on file to calculate BMI.     BP Readings from Last 2 Encounters:   05/29/22 (!) 163/97         PSYCH:     SI denies suicidal ideation

## 2022-05-29 NOTE — PROGRESS NOTES
Pt BP this AM is 185/110. HR is 75. Pt is symptomatic at this time. She c/o dizziness. Clonidine 0.1mg to be administered as ordered. Clonidine is administered at 0557. BP rechecked manually at 0637 and is 182/110. BP is trending down, but pt remains symptomatic. Pt continues to c/o dizziness. She also c/o HA. Pt denies SOA. She is not diaphoretic and denies chest pain. These results will be reported to the oncoming charge Nurse JACQUELIN Kwong for continued continuity of care.      Electronically signed by Ana Cheung RN on 5/29/2022 at 6:41 AM

## 2022-05-29 NOTE — PLAN OF CARE
Problem: Self Harm/Suicidality  Goal: Will have no self-injury during hospital stay  Description: INTERVENTIONS:  1. Q 30 MINUTES: Routine safety checks  2. Q SHIFT & PRN: Assess risk to determine if routine checks are adequate to maintain patient safety  Outcome: Progressing     Problem: Depression  Goal: Will be euthymic at discharge  Description: INTERVENTIONS:  1. Administer medication as ordered  2. Provide emotional support via 1:1 interaction with staff  3. Encourage involvement in milieu/groups/activities  4. Monitor for social isolation  Outcome: Progressing     Problem: Psychosis  Goal: Will report no hallucinations or delusions  Description: INTERVENTIONS:  1. Administer medication as  ordered  2. Assist with reality testing to support increasing orientation  3. Assess if patient's hallucinations or delusions are encouraging self harm or harm to others and intervene as appropriate  Outcome: Progressing     Problem: Behavior  Goal: Pt/Family maintain appropriate behavior and adhere to behavioral management agreement, if implemented  Description: INTERVENTIONS:  1. Assess patient/family's coping skills and  non-compliant behavior (including use of illegal substances)  2. Notify security of behavior or suspected illegal substances which indicate the need for search of the patient and/or belongings  3. Encourage verbalization of thoughts and concerns in a socially appropriate manner  4. Utilize positive, consistent limit setting strategies supporting safety of patient, staff and others  5. Encourage participation in the decision making process about the behavioral management agreement  6. Implement a Health Care Agreement if patient meets criteria  7. If a patient's behavior jeopardizes the safety of the patient, staff, or others refer to organization policy. If a visitor's behavior poses a threat to safety call refer to organization policy.   8. Initiate consult with , Psychosocial CNS, Spiritual Care as appropriate  Outcome: Progressing  Flowsheets (Taken 5/28/2022 1416 by Eloisa Parks RN)  Patient/family maintains appropriate behavior and adheres to behavioral management agreement, if implemented:   Assess patient/familys coping skills and  non-compliant behavior (including use of illegal substances)   Encourage participation in the decision making process about the behavioral management agreement     Problem: Anxiety  Goal: Will report anxiety at manageable levels  Description: INTERVENTIONS:  1. Administer medication as ordered  2. Teach and rehearse alternative coping skills  3. Provide emotional support with 1:1 interaction with staff  Outcome: Progressing  Flowsheets (Taken 5/28/2022 1416 by Eloisa Parks RN)  Will report anxiety at manageable levels:   Administer medication as ordered   Provide emotional support with 1:1 interaction with staff   Teach and rehearse alternative coping skills     Problem: Sleep Disturbance  Goal: Will exhibit normal sleeping pattern  Description: INTERVENTIONS:  1. Administer medication as ordered  2. Decrease environmental stimuli, including noise, as appropriate  3. Discourage social isolation and naps during the day  Outcome: Progressing     Problem: Self Care Deficit  Goal: Return ADL status to a safe level of function  Description: INTERVENTIONS:  1. Administer medication as ordered  2. Assess ADL deficits and provide assistive devices as needed  3. Obtain PT/OT consults as needed  4.  Assist and instruct patient to increase activity and self care as tolerated  Outcome: Progressing     Problem: Pain  Goal: Verbalizes/displays adequate comfort level or baseline comfort level  Outcome: Progressing     Problem: Safety - Adult  Goal: Free from fall injury  Outcome: Progressing

## 2022-05-29 NOTE — GROUP NOTE
Group Therapy Note    Date: 5/29/2022    Group Start Time: 1400  Group End Time: 1038  Group Topic: Recreational    [facility]    Black Yusuf RN; Coty Alanis RN    Group Therapy Note                                                                        Group Therapy Note    Date: 05/29/22  Start Time: 1400  End Time: 2330    Number of Participants: 3    Type of Group: Recreational    Patient's Goal:  Appropriately participate and interact with staff and peers    Notes:      Participation Level:  Active Listener and Interactive    Participation Quality: Appropriate    Speech:  normal    Thought Process/Content: Linear    Affective Functioning: Congruent    Mood:  calm and cooperative    Level of consciousness:  Alert and Oriented x4    Response to Learning: Able to verbalize current knowledge/experience, Able to verbalize/acknowledge new learning, Able to retain information, and Capable of insight    Modes of Intervention: Education and Support    Discipline Responsible: Registered Nurse    Signature:  Black Yusuf RN  Electronically signed by Black Yusuf RN on 5/29/2022 at 6:36 PM

## 2022-05-29 NOTE — PROGRESS NOTES
Progress Note  Stacy Young  5/29/2022 11:29 AM  Subjective:   Admit Date:   5/28/2022      CC/ADMIT DX:       Interval History:   Reviewed overnight events and nursing notes. She has c/o dizziness. I have reviewed all labs/diagnostics from the last 24hrs. ROS:   I have done a 10 point ROS and all are negative, except what is mentioned in the HPI. ADULT DIET; Regular; 1000 ml    Medications:      [START ON 5/30/2022] losartan  100 mg Oral Daily    amLODIPine  2.5 mg Oral Daily    nicotine  1 patch TransDERmal Daily    divalproex  750 mg Oral Nightly    traZODone  50 mg Oral Nightly    pantoprazole  40 mg Oral QAM AC           Objective:   Vitals: BP (!) 154/91   Pulse 73   Temp 96.8 °F (36 °C) (Temporal)   Resp 20   SpO2 91%  No intake or output data in the 24 hours ending 05/29/22 1129  General appearance: alert and cooperative with exam  Extremities: extremities normal, atraumatic, no cyanosis or edema  Neurologic:  No obvious focal neurologic deficits. Skin: no rashes    Assessment and Plan:   Principal Problem:    Acute psychosis ([address]  Active Problems:    Persistent mood (affective) disorder, unspecified (HCC)  Resolved Problems:    * No resolved hospital problems. *     HTN    H/O Hyponatremia    Plan:  1. Continue present medication(s)   2. Continue fluid restriction, labs ok  3. Adjust BP medicine and follow  4. Follow with Psych      Discharge planning:   her home     Reviewed treatment plans with the patient and/or family.              Electronically signed by Naila Benedict MD on 5/29/2022 at 11:29 AM

## 2022-05-30 LAB — HCG QUALITATIVE: NEGATIVE

## 2022-05-30 PROCEDURE — 6370000000 HC RX 637 (ALT 250 FOR IP): Performed by: PSYCHIATRY & NEUROLOGY

## 2022-05-30 PROCEDURE — 1240000000 HC EMOTIONAL WELLNESS R&B

## 2022-05-30 PROCEDURE — 99233 SBSQ HOSP IP/OBS HIGH 50: CPT | Performed by: PSYCHIATRY & NEUROLOGY

## 2022-05-30 PROCEDURE — 6370000000 HC RX 637 (ALT 250 FOR IP): Performed by: FAMILY MEDICINE

## 2022-05-30 PROCEDURE — 84703 CHORIONIC GONADOTROPIN ASSAY: CPT

## 2022-05-30 PROCEDURE — 6360000002 HC RX W HCPCS: Performed by: PSYCHIATRY & NEUROLOGY

## 2022-05-30 PROCEDURE — 36415 COLL VENOUS BLD VENIPUNCTURE: CPT

## 2022-05-30 PROCEDURE — 94640 AIRWAY INHALATION TREATMENT: CPT

## 2022-05-30 RX ADMIN — ALBUTEROL SULFATE 2.5 MG: 2.5 SOLUTION RESPIRATORY (INHALATION) at 07:52

## 2022-05-30 RX ADMIN — TRAZODONE HYDROCHLORIDE 50 MG: 50 TABLET ORAL at 20:23

## 2022-05-30 RX ADMIN — CLONIDINE HYDROCHLORIDE 0.1 MG: 0.1 TABLET ORAL at 20:24

## 2022-05-30 RX ADMIN — PANTOPRAZOLE SODIUM 40 MG: 40 TABLET, DELAYED RELEASE ORAL at 06:11

## 2022-05-30 RX ADMIN — ACETAMINOPHEN 650 MG: 325 TABLET ORAL at 20:22

## 2022-05-30 RX ADMIN — HYDROXYZINE HYDROCHLORIDE 25 MG: 25 TABLET, FILM COATED ORAL at 20:24

## 2022-05-30 RX ADMIN — LOSARTAN POTASSIUM 100 MG: 100 TABLET, FILM COATED ORAL at 08:53

## 2022-05-30 RX ADMIN — DIVALPROEX SODIUM 750 MG: 500 TABLET, EXTENDED RELEASE ORAL at 20:23

## 2022-05-30 RX ADMIN — AMLODIPINE BESYLATE 2.5 MG: 5 TABLET ORAL at 08:50

## 2022-05-30 ASSESSMENT — PAIN SCALES - GENERAL
PAINLEVEL_OUTOF10: 0
PAINLEVEL_OUTOF10: 8

## 2022-05-30 ASSESSMENT — PAIN DESCRIPTION - LOCATION: LOCATION: BACK

## 2022-05-30 ASSESSMENT — PAIN DESCRIPTION - ORIENTATION: ORIENTATION: LOWER

## 2022-05-30 ASSESSMENT — PAIN - FUNCTIONAL ASSESSMENT: PAIN_FUNCTIONAL_ASSESSMENT: ACTIVITIES ARE NOT PREVENTED

## 2022-05-30 ASSESSMENT — PAIN DESCRIPTION - DESCRIPTORS: DESCRIPTORS: ACHING;DISCOMFORT

## 2022-05-30 NOTE — PLAN OF CARE
Problem: Self Harm/Suicidality  Goal: Will have no self-injury during hospital stay  Outcome: Progressing     Problem: Depression  Goal: Will be euthymic at discharge  Outcome: Progressing     Problem: Psychosis  Goal: Will report no hallucinations or delusions  Outcome: Progressing     Problem: Behavior  Goal: Pt/Family maintain appropriate behavior and adhere to behavioral management agreement, if implemented  Outcome: Progressing

## 2022-05-30 NOTE — PROGRESS NOTES
Department of Psychiatry  Attending Progress Note     Chief complaint: \"Much better! \"    SUBJECTIVE:   Chart reviewed, discussed with the team. No major issues overnight. Patient is med-compliant. No SEs. Performs ADLs. Social and goes to groups. HTN improved. Patient seen in her room this am. Calm ,cooperative, smiling. Brighter affect. \"So much better today! \"  Denies SI/HI/AVH. Rates depression 3/10, anxiety 1/10. Slept 6h. Denies physical complaints. \"I'll try to call family today. I hope I can go home soon. \"    OBJECTIVE    Physical  Wt Readings from Last 3 Encounters:   No data found for Wt     Temp Readings from Last 3 Encounters:   05/30/22 97 °F (36.1 °C) (Temporal)     BP Readings from Last 3 Encounters:   05/30/22 138/78     Pulse Readings from Last 3 Encounters:   05/30/22 72        Review of Systems: 14-point review of systems negative except as described above    Mental Status Examination:   Appearance:  Older than stated age. Gait stable. No abnormal movements or tremor. Behavior: Calm , cooperative, smiling  Speech: Normal in tone, volume, and quality. No slurring, dysarthria or pressured speech noted. Mood: \"Better \"   Affect: Mood congruent. Thought Process: Appears linear. Thought Content:  Denies SI/HI. No overt delusions or paranoia appreciated. Perceptions: Denies auditory or visual hallucinations at present time. Not responding to internal stimuli. Concentration: Intact. Orientation: to person, place, date, and situation. Language: Intact. Fund of information: Intact. Memory: Recent and remote appear intact. Neurovegitative: Improved appetite and sleep.    Insight: Improving   Judgment: Improving    Data  Lab Results   Component Value Date    WBC 5.8 05/29/2022    HGB 16.0 05/29/2022    HCT 50.1 (H) 05/29/2022    MCV 87.9 05/29/2022     (H) 05/29/2022      Lab Results   Component Value Date     05/29/2022    K 4.7 05/29/2022    CL 97 (L) 05/29/2022    CO2 29 05/29/2022    BUN 5 (L) 05/29/2022    CREATININE 0.5 05/29/2022    GLUCOSE 95 05/29/2022    CALCIUM 9.3 05/29/2022    PROT 6.2 (L) 05/29/2022    LABALBU 4.1 05/29/2022    BILITOT 0.4 05/29/2022    ALKPHOS 107 (H) 05/29/2022    AST 12 05/29/2022    ALT 9 05/29/2022    LABGLOM >60 05/29/2022    GFRAA >59 05/29/2022       Medications    Current Facility-Administered Medications:     losartan (COZAAR) tablet 100 mg, 100 mg, Oral, Daily, Xavier Jeffers MD, 100 mg at 05/30/22 0853    amLODIPine (NORVASC) tablet 2.5 mg, 2.5 mg, Oral, Daily, Xavier Jeffers MD, 2.5 mg at 05/30/22 0850    albuterol (PROVENTIL) nebulizer solution 2.5 mg, 2.5 mg, Nebulization, 4x Daily PRN, Yulissa Braxton MD, 2.5 mg at 05/30/22 0752    acetaminophen (TYLENOL) tablet 650 mg, 650 mg, Oral, Q4H PRN, Yulissa Braxton MD, 650 mg at 05/29/22 2034    polyethylene glycol (GLYCOLAX) packet 17 g, 17 g, Oral, Daily PRN, Yulissa Braxton MD    nicotine (NICODERM CQ) 21 MG/24HR 1 patch, 1 patch, TransDERmal, Daily, Yulissa Braxton MD, 1 patch at 05/30/22 0854    divalproex (DEPAKOTE ER) extended release tablet 750 mg, 750 mg, Oral, Nightly, Yulissa Braxton MD, 750 mg at 05/29/22 2035    traZODone (DESYREL) tablet 50 mg, 50 mg, Oral, Nightly, Yulissa Braxton MD, 50 mg at 05/29/22 2034    hydrOXYzine (ATARAX) tablet 25 mg, 25 mg, Oral, TID PRN, Yulissa Braxton MD, 25 mg at 05/29/22 2035    pantoprazole (PROTONIX) tablet 40 mg, 40 mg, Oral, QAM AC, Xavier Aury, MD, 40 mg at 05/30/22 6610    cloNIDine (CATAPRES) tablet 0.1 mg, 0.1 mg, Oral, TID PRN, Yulissa Braxton MD, 0.1 mg at 05/29/22 0525    ASSESSMENT AND PLAN  DSM 5 DIAGNOSIS  Impression  Mood disorder unspecified  Anxiety unspecified  Insomnia unspecified  Cannabis use severe  Tobacco use disorder    Improving. Continue to observe. Plan:   1. Psychiatric Medications:   Continue current psychotropic medications as recommended. Monitor for side effects.   The risks, benefits,

## 2022-05-30 NOTE — PSYCHOTHERAPY
WRAP UP GROUP NOTE    Patient's Goal:  Providing feedback as to their own progress in the care-plan provided. Patients have an opportunity to explore self-reflective skills and share any additional cares and concerns not yet addressed. Pt effectively participated.     Energy Level:low  Appetite:improving  Concentration:improving  Hallucinations:gone  Depression:worse  Anxiety:improved  How I worked today:worked hard  What helps me sleep:try a relaxation exercise  Any questions/complaints/comments:\"no\"    Group/activities that helped me today were:    Community/Goals  Seeing Doctor(s)  One-to-One with Staff  Relaxation Training    Electronically signed by Don Pham RN on 5/29/2022 at 9:28 PM

## 2022-05-30 NOTE — PROGRESS NOTES
[facility] Daily Shift Assessment-Geriatric Unit  Nursing Progress Note          Room: [address] [place] [address]   Name: Jayne Croft   Age: 61 y.o. Gender: female   Dx: Acute psychosis ([address]  Precautions: suicide risk and fall risk  Inpatient Status: voluntary     SLEEP:    Sleep: Yes,   Sleep Quality Fair   Hours Slept: 4   Sleep Medications: No  PRN Sleep Meds: No       MEDICAL:      Other PRN Meds: Yes   Med Compliant: Yes   Accu-Chek: No  Oxygen/CPAP/BiPAP: No  CIWA/CINA: No   PAIN Assessment: present - adequately treated  Side Effects from medication: No    COVID TEACHING:    Is Patient experiencing any respiratory symptoms (headache, fever, body aches, cough. Bula Dayhoff ): no  Patient educated by nursing to practice social distancing, wear masks, wash hands frequently: yes    Medical Bed:   Is patient in a medical bed? yes   If medical bed is in use, has nursing secured room while patient is awake and out of the room? yes  Has safety checks by nursing been completed on the bed/room this shift? yes    Protective Factors:    Patient identifies protective factors with nursing staff as follows: Identifies reasons for living: Yes   Supportive Social Network or family: Yes    Belief that suicide is immoral/high spirituality: Yes   Responsibility to family or others/living with family: Yes   Fear of death or dying due to pain and suffering: No   Engaged in work or school: No       If Patient is unable to identify, reason why? na      Metabolic Screening:    Lab Results   Component Value Date    LABA1C 5.7 05/29/2022       Lab Results   Component Value Date    CHOL 187 05/29/2022     Lab Results   Component Value Date    TRIG 88 05/29/2022     Lab Results   Component Value Date    HDL 51 (L) 05/29/2022     No components found for: LDLCAL  No results found for: LABVLDL      There is no height or weight on file to calculate BMI.     BP Readings from Last 2 Encounters:   05/30/22 138/78         PSYCH:     SI denies suicidal ideation    HI Negative for homicidal ideation        AVH:Absent      Depression: Didn't rate, but reports improving. Anxiety: Didn't rate but reports better.        GENERAL:      Appetite: good  Percent Meals: 75%   Social: Yes Speech: pressured and loud   Appearance:appropriately dressed, disheveled and healthy looking  Assistive Devices: noneLevel of Assist: Independent      GROUP:    Group Participation: Yes  Participation LevelInteractive    Participation QualityAttentive    Notes:

## 2022-05-30 NOTE — PROGRESS NOTES
Progress Note  Stacy Stallings Person  5/30/2022 1:11 PM  Subjective:   Admit Date:   5/28/2022      CC/ADMIT DX:       Interval History:   Reviewed overnight events and nursing notes. She has no new medical complaints. Her dizziness is improved. I have reviewed all labs/diagnostics from the last 24hrs. ROS:   I have done a 10 point ROS and all are negative, except what is mentioned in the HPI. ADULT DIET; Regular; 1000 ml    Medications:      losartan  100 mg Oral Daily    amLODIPine  2.5 mg Oral Daily    nicotine  1 patch TransDERmal Daily    divalproex  750 mg Oral Nightly    traZODone  50 mg Oral Nightly    pantoprazole  40 mg Oral QAM AC           Objective:   Vitals: /78   Pulse 72   Temp 97 °F (36.1 °C) (Temporal)   Resp 16   SpO2 97%      Intake/Output Summary (Last 24 hours) at 5/30/2022 1311  Last data filed at 5/30/2022 0800  Gross per 24 hour   Intake 480 ml   Output --   Net 480 ml     General appearance: alert and cooperative with exam  Extremities: extremities normal, atraumatic, no cyanosis or edema  Neurologic:  No obvious focal neurologic deficits. Skin: no rashes    Assessment and Plan:   Principal Problem:    Acute psychosis ([address]  Active Problems:    Persistent mood (affective) disorder, unspecified (HCC)  Resolved Problems:    * No resolved hospital problems. *     HTN    H/O Hyponatremia    Plan:  1. Continue present medication(s)   2. Follow BP with changes in medicine  3. Follow with Psych      Discharge planning:   her home     Reviewed treatment plans with the patient and/or family.              Electronically signed by Roseann Santiago MD on 5/30/2022 at 1:11 PM

## 2022-05-30 NOTE — PROGRESS NOTES
SW met with patient to complete Psychosocial and Lifetime CSSR-S on this date. Patients long and short-term goals discussed. Patient voiced understanding. Treatment plan sheet signed. Patient verbalized understanding of the treatment plan. Patient participated in goals and objectives of the treatment plan. Patient discussed safety plan with . In the last 6 months has the patient been a danger to self: NO  In the last 6 months has the patient been a danger to others: NO  Legal Guardian/POA: NO     Provided patient with [employer] Online handout entitled \"Quitting Smoking. \"  Reviewed handout with patient: addressing dangers of smoking, developing coping skills, and providing basic information about quitting. Patient received all components practical counseling of tobacco practical counseling during the hospital stay.       Electronically signed by IDALIA Tran on 5/30/2022 at 11:33 AM

## 2022-05-30 NOTE — PLAN OF CARE
Problem: Self Harm/Suicidality  Goal: Will have no self-injury during hospital stay  Description: INTERVENTIONS:  1. Q 30 MINUTES: Routine safety checks  2. Q SHIFT & PRN: Assess risk to determine if routine checks are adequate to maintain patient safety  5/29/2022 2308 by Josselin Venegas RN  Outcome: Progressing  5/29/2022 0954 by Radha Luis RN  Outcome: Progressing     Problem: Depression  Goal: Will be euthymic at discharge  Description: INTERVENTIONS:  1. Administer medication as ordered  2. Provide emotional support via 1:1 interaction with staff  3. Encourage involvement in milieu/groups/activities  4. Monitor for social isolation  5/29/2022 2308 by Josselin Venegas RN  Outcome: Progressing  5/29/2022 0954 by Radha Luis RN  Outcome: Progressing     Problem: Psychosis  Goal: Will report no hallucinations or delusions  Description: INTERVENTIONS:  1. Administer medication as  ordered  2. Assist with reality testing to support increasing orientation  3. Assess if patient's hallucinations or delusions are encouraging self harm or harm to others and intervene as appropriate  5/29/2022 2308 by Josselin Venegas RN  Outcome: Progressing  5/29/2022 0954 by Radha Luis RN  Outcome: Progressing     Problem: Behavior  Goal: Pt/Family maintain appropriate behavior and adhere to behavioral management agreement, if implemented  Description: INTERVENTIONS:  1. Assess patient/family's coping skills and  non-compliant behavior (including use of illegal substances)  2. Notify security of behavior or suspected illegal substances which indicate the need for search of the patient and/or belongings  3. Encourage verbalization of thoughts and concerns in a socially appropriate manner  4. Utilize positive, consistent limit setting strategies supporting safety of patient, staff and others  5. Encourage participation in the decision making process about the behavioral management agreement  6.  Implement a Health Care Agreement if patient meets criteria  7. If a patient's behavior jeopardizes the safety of the patient, staff, or others refer to organization policy. If a visitor's behavior poses a threat to safety call refer to organization policy.   8. Initiate consult with , Psychosocial CNS, Spiritual Care as appropriate  5/29/2022 2303 by Steph Betancourt RN  Outcome: Progressing  Flowsheets (Taken 5/29/2022 0962 by Sarbjit Little RN)  Patient/family maintains appropriate behavior and adheres to behavioral management agreement, if implemented:   Assess patient/familys coping skills and  non-compliant behavior (including use of illegal substances)   Encourage verbalization of thoughts and concerns in a socially appropriate manner   Encourage participation in the decision making process about the behavioral management agreement  5/29/2022 0931 by Sarbjit Little RN  Outcome: Progressing

## 2022-05-30 NOTE — RESEARCH
Norma attempted to collect collateral but there was no answer at the given phone number. SW left a generic voicemail asking for a return call. NORMA will attempt collateral again on 5/31/22.        Electronically signed by IDALIA Mejía on 5/30/2022 at 12:15 PM

## 2022-05-30 NOTE — PROGRESS NOTES
[facility] Daily Shift Assessment-Geriatric Unit  Nursing Progress Note          Room: [address] [place] [address]   Name: Radha Julian   Age: 61 y.o. Gender: female   Dx: Acute psychosis ([address]  Precautions: suicide risk and fall risk  Inpatient Status: voluntary     SLEEP:    Sleep: Yes,   Sleep Quality Good   Hours Slept:    Sleep Medications: Yes;trazadone 50mg  PRN Sleep Meds: no       MEDICAL:      Other PRN Meds: Yes; tylenol 650mg, atarax 25mg   Med Compliant: Yes   Accu-Chek: No   Oxygen/CPAP/BiPAP: No  CIWA/CINA: No   PAIN Assessment: receiving treatment  Side Effects from medication: none voiced    COVID TEACHING:    Is Patient experiencing any respiratory symptoms (headache, fever, body aches, cough. [place] ): no  Patient educated by nursing to practice social distancing, wear masks, wash hands frequently: yes    Medical Bed:   Is patient in a medical bed? yes   If medical bed is in use, has nursing secured room while patient is awake and out of the room? yes  Has safety checks by nursing been completed on the bed/room this shift? yes    Protective Factors:    Patient identifies protective factors with nursing staff as follows: Identifies reasons for living: Yes   Supportive Social Network or family: Yes    Belief that suicide is immoral/high spirituality: Yes   Responsibility to family or others/living with family: Yes   Fear of death or dying due to pain and suffering: No   Engaged in work or school: No     If Patient is unable to identify, reason why? N/A      Metabolic Screening:    Lab Results   Component Value Date    LABA1C 5.7 05/29/2022       Lab Results   Component Value Date    CHOL 187 05/29/2022     Lab Results   Component Value Date    TRIG 88 05/29/2022     Lab Results   Component Value Date    HDL 51 (L) 05/29/2022     No components found for: LDLCAL  No results found for: LABVLDL      There is no height or weight on file to calculate BMI.     BP Readings from Last 2 Encounters:   05/29/22 (!) 144/88 PSYCH:     SI denies suicidal ideation    HI Negative for homicidal ideation        AVH:denies      Depression: 4 Anxiety: 2       GENERAL:      Appetite: good  Percent Meals: no meals consumed during this shift, snacks only. Pt reports good appetite   Social: Yes Speech: normal   Appearance:appropriately dressed  Assistive Devices: wheelchairLevel of Assist: Minimal Assist      GROUP:    Group Participation: Yes  Participation LevelMinimal    Participation QualityAppropriate    Notes: Pt has been cooperative tonight with her interview. She is social with staff and peers. She sat in the tv area most of the shift with peers watching movies talking, laughing and enjoying each other's company tonight. Her affect was bright during this time. She continues to use her wheelchair for some c/o weakness/dizziness. BP was trending down tonight at 144/88. Pt will be encouraged to ambulate more tomorrow with nurse assist. Pt reports improvement in her depression and her anxiety and rates her depression a 4 and her anxiety a 2. She denies SI, HI, and AVH. She reports generalized pain and received a tylenol and an atarax for the anxiety rating. Will continue to monitor for safety as ordered.      Electronically signed by Sherren Gibney, RN on 5/29/2022 at 11:46 PM

## 2022-05-31 VITALS
WEIGHT: 122.8 LBS | TEMPERATURE: 96.5 F | HEART RATE: 75 BPM | DIASTOLIC BLOOD PRESSURE: 90 MMHG | SYSTOLIC BLOOD PRESSURE: 158 MMHG | OXYGEN SATURATION: 95 % | RESPIRATION RATE: 20 BRPM | BODY MASS INDEX: 20.46 KG/M2 | HEIGHT: 65 IN

## 2022-05-31 PROBLEM — F17.200 TOBACCO USE DISORDER: Chronic | Status: ACTIVE | Noted: 2022-05-31

## 2022-05-31 PROBLEM — F23 ACUTE PSYCHOSIS (HCC): Status: RESOLVED | Noted: 2022-05-28 | Resolved: 2022-05-31

## 2022-05-31 PROBLEM — G47.00 INSOMNIA, UNSPECIFIED: Chronic | Status: ACTIVE | Noted: 2022-05-31

## 2022-05-31 PROBLEM — F41.9 ANXIETY DISORDER, UNSPECIFIED: Chronic | Status: ACTIVE | Noted: 2022-05-31

## 2022-05-31 PROBLEM — F12.20 CANNABIS USE DISORDER, SEVERE, DEPENDENCE (HCC): Chronic | Status: ACTIVE | Noted: 2022-05-31

## 2022-05-31 LAB
ANION GAP SERPL CALCULATED.3IONS-SCNC: 7 MMOL/L (ref 7–19)
BUN BLDV-MCNC: 6 MG/DL (ref 6–20)
CALCIUM SERPL-MCNC: 9.5 MG/DL (ref 8.6–10)
CHLORIDE BLD-SCNC: 95 MMOL/L (ref 98–111)
CO2: 34 MMOL/L (ref 22–29)
CREAT SERPL-MCNC: 0.5 MG/DL (ref 0.5–0.9)
GFR AFRICAN AMERICAN: >59
GFR NON-AFRICAN AMERICAN: >60
GLUCOSE BLD-MCNC: 91 MG/DL (ref 74–109)
POTASSIUM SERPL-SCNC: 4.3 MMOL/L (ref 3.5–5)
SODIUM BLD-SCNC: 136 MMOL/L (ref 136–145)

## 2022-05-31 PROCEDURE — 5130000000 HC BRIDGE APPOINTMENT

## 2022-05-31 PROCEDURE — 80048 BASIC METABOLIC PNL TOTAL CA: CPT

## 2022-05-31 PROCEDURE — 99239 HOSP IP/OBS DSCHRG MGMT >30: CPT | Performed by: PSYCHIATRY & NEUROLOGY

## 2022-05-31 PROCEDURE — 6370000000 HC RX 637 (ALT 250 FOR IP): Performed by: PSYCHIATRY & NEUROLOGY

## 2022-05-31 PROCEDURE — 36415 COLL VENOUS BLD VENIPUNCTURE: CPT

## 2022-05-31 PROCEDURE — 6370000000 HC RX 637 (ALT 250 FOR IP): Performed by: FAMILY MEDICINE

## 2022-05-31 RX ORDER — DIVALPROEX SODIUM 250 MG/1
750 TABLET, EXTENDED RELEASE ORAL NIGHTLY
Qty: 42 TABLET | Refills: 0 | Status: SHIPPED | OUTPATIENT
Start: 2022-05-31 | End: 2022-06-14

## 2022-05-31 RX ORDER — AMLODIPINE BESYLATE 2.5 MG/1
2.5 TABLET ORAL DAILY
Qty: 14 TABLET | Refills: 0 | Status: SHIPPED | OUTPATIENT
Start: 2022-05-31 | End: 2022-06-14

## 2022-05-31 RX ORDER — LOSARTAN POTASSIUM 100 MG/1
100 TABLET ORAL DAILY
Qty: 14 TABLET | Refills: 0 | Status: SHIPPED | OUTPATIENT
Start: 2022-05-31 | End: 2022-06-14

## 2022-05-31 RX ORDER — ALBUTEROL SULFATE 2.5 MG/3ML
2.5 SOLUTION RESPIRATORY (INHALATION) 4 TIMES DAILY PRN
Qty: 120 EACH | Refills: 0 | Status: SHIPPED | OUTPATIENT
Start: 2022-05-31

## 2022-05-31 RX ORDER — CLONIDINE HYDROCHLORIDE 0.1 MG/1
0.1 TABLET ORAL ONCE
Status: COMPLETED | OUTPATIENT
Start: 2022-05-31 | End: 2022-05-31

## 2022-05-31 RX ORDER — PANTOPRAZOLE SODIUM 40 MG/1
40 TABLET, DELAYED RELEASE ORAL
Qty: 14 TABLET | Refills: 0 | Status: SHIPPED | OUTPATIENT
Start: 2022-06-01 | End: 2022-06-15

## 2022-05-31 RX ORDER — TRAZODONE HYDROCHLORIDE 50 MG/1
50 TABLET ORAL NIGHTLY
Qty: 14 TABLET | Refills: 0 | Status: SHIPPED | OUTPATIENT
Start: 2022-05-31 | End: 2022-06-14

## 2022-05-31 RX ADMIN — AMLODIPINE BESYLATE 2.5 MG: 5 TABLET ORAL at 08:36

## 2022-05-31 RX ADMIN — CLONIDINE HYDROCHLORIDE 0.1 MG: 0.1 TABLET ORAL at 08:37

## 2022-05-31 RX ADMIN — PANTOPRAZOLE SODIUM 40 MG: 40 TABLET, DELAYED RELEASE ORAL at 05:48

## 2022-05-31 RX ADMIN — LOSARTAN POTASSIUM 100 MG: 100 TABLET, FILM COATED ORAL at 08:37

## 2022-05-31 NOTE — PROGRESS NOTES
RENE met with treatment team to discuss pt's progress and setbacks. Patient was a direct admit from the [facility] unit at [facility] and was admitted to [facility] due to visual hallucinations of  sister (but then reports sister is not actually ). Pt confused of time and place. She states that she has been severely depressed due to her social stressors. Reports severe mood swings and racing thoughts. States her fiancé has been calling her crazy. She denies suicidal ideation at this time, but she reports poor sleep. She is open to medication adjustment. Pt has a history of MDD, PTSD and cannabis use.    Since admission, pt reportedly was more social, brighter affect, and cooperative with staff , alert/oriented x 4, slept good last night, with medications, appetite is good, independent with ADLS, social with others, compliant with medications, focused on medications, reports mild depression/anxiety, denies SI/HI/AVH, continue care at home. Pt is being discharged today.             Electronically signed by IDALIA Cohen on 2022 at 3:58 PM

## 2022-05-31 NOTE — PROGRESS NOTES
WRAP UP GROUP NOTE:     Patient's Goal:  Providing feedback as to their own progress in the care-plan provided. Pt's have an opportunity to explore self-reflective skills and share any additional cares and concerns not yet addressed. Pt effectively participated. Energy level:  Normal   Appetite:  Improving   Concentration:   Good   Hallucinations:  Gone   Depression:  Improved   Anxiety:  Gone   How I worked today:  Worked hard  What helps me sleep:  Try a relaxation exercise  Any questions/complaints/comments:  Blank   Group/activities that helped me today were:  Seeing doctor; One-to-one with staff.

## 2022-05-31 NOTE — DISCHARGE SUMMARY
Discharge Note     Pt discharging on this date. Pt denies SI, HI, and AVH at this time. Pt reports improvement in behavior and is leaving unit in overall good condition. SW and pt discussed pt's follow up appointments and importance of attending appointments as scheduled, pt voiced understanding and agreement. Pt and SW also discussed pt safety plan and pt able to verbally identify: warning signs, coping strategies, places and people that help make the pt feel better/distract negative thoughts, friends/family/agencies/professionals the pt can reach out to in a crisis, and something that is important to the pt/worth living for. Pt provided the national suicide prevention hotline number (3-437-037-8020) as well as local community behavioral health [facility]) crisis number for emergencies (1-864.403.2718). Pt to follow up with:  [id number] [place] [address] ([address]) on _06_/_06_/22 @ 01:00 PM. Patient will follow up with Yoel for medication management, patient will be seen on _06_/_07_/22 @ 10:00 AM for the med management appt. Patient refused referral to outpatient tobacco cessation counseling    SW offered to assist pt with transportation, pt reports having adequate transportation. Pt needed assistance with medication co-pay. RENE faxed voucher from [facility] to outpatient pharmacy in the amount of $3.15.       Electronically signed by IDALIA Mejía on 5/31/2022 at 4:03 PM

## 2022-05-31 NOTE — DISCHARGE INSTR - COC
Medications:   Medication Summary Provided. I understand that I should take only the medications on my list.   --why and when I need to take each medication. --which side effects to watch for.   --that I should carry my medication information at all times in case of emergency    Situations. --I will take all medications until discontinued by physician. I will take all my medications to follow up appointments. --check with my physician or pharmacist before taking any new medication, over    the counter product or drink alcohol.   --ask about food, drug and dietary supplement interactions. --discard old lists and update records with medication providers. Behavior Health Follow Up:  Original Referral Source: ED  Discharge Diagnosis:   Patient Active Problem List   Diagnosis    Persistent mood (affective) disorder, unspecified (HCC)    Anxiety disorder, unspecified    Insomnia, unspecified    Cannabis use disorder, severe, dependence ([facility] [address]    Tobacco use disorder     Recomendations for Level of Care: Follow Up  Patient Status at Discharge: Stable  [facility] Hospital  was: [facility]  Aftercare plan faxed:    Faxed by: Social Work staff   Date: 22   Time: 11:00  Prescriptions sent with pt.     General Information:   Questions regarding your bill: Call [phone]   Suicide Hotline (Rescue Crisis) 0-303.188.7280   To obtain results of pending studies call Medical Records at: [phone]   For emergencies and 24 hour/7 days a week contact information: [phone]

## 2022-05-31 NOTE — PLAN OF CARE
Problem: Self Harm/Suicidality  Goal: Will have no self-injury during hospital stay  5/31/2022 0848 by Lucina Medellin RN  Outcome: Completed  5/30/2022 2331 by Jame Slater RN  Outcome: Progressing     Problem: Depression  Goal: Will be euthymic at discharge  5/31/2022 0848 by Lucina Medellin RN  Outcome: Completed  5/30/2022 2331 by Jame Slater RN  Outcome: Progressing     Problem: Psychosis  Goal: Will report no hallucinations or delusions  5/31/2022 0848 by Lucina Medellin RN  Outcome: Completed  5/30/2022 2331 by Jame Slater RN  Outcome: Progressing     Problem: Behavior  Goal: Pt/Family maintain appropriate behavior and adhere to behavioral management agreement, if implemented  5/31/2022 0848 by Lucina Medellin RN  Outcome: Completed  Flowsheets (Taken 5/31/2022 0052 by Jame Slater RN)  Patient/family maintains appropriate behavior and adheres to behavioral management agreement, if implemented: Assess patient/familys coping skills and  non-compliant behavior (including use of illegal substances)  5/30/2022 2331 by Jame Slater RN  Outcome: Progressing     Problem: Anxiety  Goal: Will report anxiety at manageable levels  5/31/2022 0848 by Lucina Medellin RN  Outcome: Completed  Flowsheets (Taken 5/31/2022 0052 by Jame Slater RN)  Will report anxiety at manageable levels: Administer medication as ordered  5/30/2022 2331 by Jame Slater RN  Outcome: Progressing     Problem: Sleep Disturbance  Goal: Will exhibit normal sleeping pattern  5/31/2022 0848 by Lucina Medellin RN  Outcome: Completed  5/30/2022 2331 by Jame Slater RN  Outcome: Progressing     Problem: Self Care Deficit  Goal: Return ADL status to a safe level of function  5/31/2022 0848 by Lucina Medellin RN  Outcome: Completed  5/30/2022 2331 by Jame Slater RN  Outcome: Progressing     Problem: Pain  Goal: Verbalizes/displays adequate comfort level or baseline comfort level  5/31/2022 0848 by Lucina Medellin RN  Outcome: Completed  5/30/2022 2331 by Rafi Dougherty RN  Outcome: Progressing     Problem: Safety - Adult  Goal: Free from fall injury  5/31/2022 0848 by Ivory Barnett RN  Outcome: Completed  5/30/2022 2331 by Rafi Dougherty RN  Outcome: Progressing

## 2022-05-31 NOTE — PROGRESS NOTES
[facility] Daily Shift Assessment-Geriatric Unit  Nursing Progress Note          Room: [address] [place] [address]   Name: Julia Nava   Age: 61 y.o. Gender: female   Dx: Acute psychosis ([address]  Precautions: suicide risk and fall risk  Inpatient Status: voluntary     SLEEP:    Sleep: Yes,   Sleep Quality Good   Hours Slept: 7   Sleep Medications: Yes  PRN Sleep Meds: No       MEDICAL:      Other PRN Meds: Yes   Med Compliant: Yes   Accu-Chek: No   Oxygen/CPAP/BiPAP: No  CIWA/CINA: No   PAIN Assessment: present - adequately treated  Side Effects from medication: No    COVID TEACHING:    Is Patient experiencing any respiratory symptoms (headache, fever, body aches, cough. [place] ): no  Patient educated by nursing to practice social distancing, wear masks, wash hands frequently: yes    Medical Bed:   Is patient in a medical bed? yes   If medical bed is in use, has nursing secured room while patient is awake and out of the room? yes  Has safety checks by nursing been completed on the bed/room this shift? yes    Protective Factors:    Patient identifies protective factors with nursing staff as follows: Identifies reasons for living: Yes   Supportive Social Network or family: Yes    Belief that suicide is immoral/high spirituality: Yes   Responsibility to family or others/living with family: Yes   Fear of death or dying due to pain and suffering: No   Engaged in work or school: No     If Patient is unable to identify, reason why? N/A      Metabolic Screening:    Lab Results   Component Value Date    LABA1C 5.7 05/29/2022       Lab Results   Component Value Date    CHOL 187 05/29/2022     Lab Results   Component Value Date    TRIG 88 05/29/2022     Lab Results   Component Value Date    HDL 51 (L) 05/29/2022     No components found for: LDLCAL  No results found for: LABVLDL      Body mass index is 20.43 kg/m².     BP Readings from Last 2 Encounters:   05/30/22 (!) 157/88         PSYCH:     SI: denies suicidal ideation    HI Negative for homicidal ideation        AVH:Absent      Depression: 0 Anxiety: 0       GENERAL:      Appetite: improved  Percent Meals:  Patient did not eat any meals this shift, ate a snack   Social: Yes Speech: pressured   Appearance:appropriately dressed and disheveled  Assistive Devices: noneLevel of Assist: Independent      GROUP:    Group Participation: Yes  Participation LevelMinimal    Participation QualityAppropriate    Notes:   Patient sitting in the TV area watching TV with another patient at the beginning of the shift. Patient calm and cooperative with staff and other patients. Patient's blood pressure was high when vital signs taken, PRN medication given as ordered, blood pressure rechecked and improvement noted. Patient resting in bed at this time with eyes closed. Will continue to monitor for safety.           Electronically signed by Nithin Brian RN on 5/31/22 at 12:52 AM CDT

## 2022-05-31 NOTE — PROGRESS NOTES
CLINICAL PHARMACY NOTE: MEDS TO BEDS    Total # of Prescriptions Filled: 6   The following medications were delivered to the patient:  · Amlodipine 2.5 mg  · Divalproex  mg  · Losartan 100 mg  · Trazodone 50 mg  · Pantoprazole 40 mg  · Albuterol Sulfate 2.5mg/3ml    Additional Documentation:    Handed scripts to [employer] (Rn) at [facility]

## 2022-05-31 NOTE — PLAN OF CARE
Problem: Self Harm/Suicidality  Goal: Will have no self-injury during hospital stay  Description: INTERVENTIONS:  1. Q 30 MINUTES: Routine safety checks  2. Q SHIFT & PRN: Assess risk to determine if routine checks are adequate to maintain patient safety  5/30/2022 2331 by Adrienne Pichardo RN  Outcome: Progressing  5/30/2022 1616 by Severo Screen, RN  Outcome: Progressing     Problem: Depression  Goal: Will be euthymic at discharge  Description: INTERVENTIONS:  1. Administer medication as ordered  2. Provide emotional support via 1:1 interaction with staff  3. Encourage involvement in milieu/groups/activities  4. Monitor for social isolation  5/30/2022 2331 by Adrienne Pichardo RN  Outcome: Progressing  5/30/2022 1616 by Severo Screen, RN  Outcome: Progressing     Problem: Psychosis  Goal: Will report no hallucinations or delusions  Description: INTERVENTIONS:  1. Administer medication as  ordered  2. Assist with reality testing to support increasing orientation  3. Assess if patient's hallucinations or delusions are encouraging self harm or harm to others and intervene as appropriate  5/30/2022 2331 by Adrienne Pichardo RN  Outcome: Progressing  5/30/2022 1616 by Severo Screen, RN  Outcome: Progressing     Problem: Behavior  Goal: Pt/Family maintain appropriate behavior and adhere to behavioral management agreement, if implemented  Description: INTERVENTIONS:  1. Assess patient/family's coping skills and  non-compliant behavior (including use of illegal substances)  2. Notify security of behavior or suspected illegal substances which indicate the need for search of the patient and/or belongings  3. Encourage verbalization of thoughts and concerns in a socially appropriate manner  4. Utilize positive, consistent limit setting strategies supporting safety of patient, staff and others  5. Encourage participation in the decision making process about the behavioral management agreement  6.  Implement a Health Care Agreement if patient meets criteria  7. If a patient's behavior jeopardizes the safety of the patient, staff, or others refer to organization policy. If a visitor's behavior poses a threat to safety call refer to organization policy. 8. Initiate consult with , Psychosocial CNS, Spiritual Care as appropriate  5/30/2022 8131 by Agustina Mendoza RN  Outcome: Progressing  5/30/2022 1616 by Cuca Mendez RN  Outcome: Progressing     Problem: Anxiety  Goal: Will report anxiety at manageable levels  Description: INTERVENTIONS:  1. Administer medication as ordered  2. Teach and rehearse alternative coping skills  3. Provide emotional support with 1:1 interaction with staff  Outcome: Progressing     Problem: Sleep Disturbance  Goal: Will exhibit normal sleeping pattern  Description: INTERVENTIONS:  1. Administer medication as ordered  2. Decrease environmental stimuli, including noise, as appropriate  3. Discourage social isolation and naps during the day  Outcome: Progressing     Problem: Self Care Deficit  Goal: Return ADL status to a safe level of function  Description: INTERVENTIONS:  1. Administer medication as ordered  2. Assess ADL deficits and provide assistive devices as needed  3. Obtain PT/OT consults as needed  4.  Assist and instruct patient to increase activity and self care as tolerated  Outcome: Progressing     Problem: Pain  Goal: Verbalizes/displays adequate comfort level or baseline comfort level  Outcome: Progressing     Problem: Safety - Adult  Goal: Free from fall injury  Outcome: Progressing

## 2022-05-31 NOTE — DISCHARGE INSTR - ACTIVITY
Learning About Activities of Daily Living  What are activities of daily living? Activities of daily living (ADLs) are the basic self-care tasks you do every day. As you age, and if you have health problems, you may find that it's harderto do these things for yourself. That's when you may need some help. Your doctor uses ADLs to measure how much help you need. Knowing what you can and can't do for yourself is an important first step to getting help. And whenyou have the help you need, you can stay as independent as possible. Your doctor will want to know if you are able to do tasks such as: Take a bath or shower without help. Go to the bathroom by yourself. Dress and undress without help. Shave, comb your hair, and brush teeth on your own. Get in and out of bed or a chair without help. Feed yourself without help. If you are having trouble doing basic self-care tasks, talk with your doctor. You may want to bring a caregiver or family member who can help the doctorunderstand your needs and abilities. How will a doctor assess your ADLs? Asking about ADLs is part of a routine health checkup your doctor will likely do as you age. Your health check might be done in a doctor's office, in your home, or at a hospital. The goal is to find out if you are having any problems that could make your health problems worse or that make it unsafe for you to beon your own. To measure your ADLs, your doctor will ask how hard it is for you to do routine tasks. He or she may also want to know if you have changed the way you do atask because of a health problem. He or she may watch how you:  Walk back and forth. Keep your balance while you stand or walk. Move from sitting to standing or from a bed to a chair. Button or unbutton a shirt or sweater. Remove and put on your shoes. It's normal to feel a little worried or anxious if you find you can't do all the things you used to be able to do.  Talking with your doctor about ADLs isn't a test that you either pass or fail. It's just a way to get more informationabout your health and safety. Follow-up care is a key part of your treatment and safety. Be sure to make and go to all appointments, and call your doctor if you are having problems. It's also a good idea to know your test results and keep alist of the medicines you take. Where can you learn more? Go to https://[email or web address].[email or web address]. org and sign in to your [employer] account. Enter A913 in the [employer] box to learn more about \"Learning About Activities of Daily Living. \"     If you do not have an account, please click on the \"Sign Up Now\" link. Current as of: July 1, 2021               Content Version: 13.2  © 2309-3795 [employer], [employer]. Care instructions adapted under license by [facility] [address] If you have questions about a medical condition or this instruction, always ask your healthcare professional. [place] [address] any warranty or liability for your use of this information.

## 2022-05-31 NOTE — DISCHARGE SUMMARY
Discharge Summary     Patient ID:  Carline Tatum  [id number]  69 y.o.  1965    Admit date: 5/28/2022  Discharge date: 5/31/2022    Admitting Physician: Duran Bello MD   Attending Physician: Duran Bello MD  Discharge Provider: Duran Bello MD     Admission Diagnoses:   Mood disorder unspecified  Anxiety unspecified  Insomnia unspecified  Cannabis use severe  Tobacco use disorder    Discharge Diagnoses:   Mood disorder unspecified  Anxiety unspecified  Insomnia unspecified  Cannabis use severe  Tobacco use disorder  HTN    Admission Condition: poor    Discharged Condition: stable    Indication for Admission: safety concern    CHIEF COMPLAINT:  \"I've been doing terrible\"     History obtained from: patient, chart     HISTORY OF PRESENT ILLNESS:    62 yo white female brought in from H with SI. New to our service.      Patient is visibly distressed when seen today. She is tearful. Her speech is loud and pressured. States she has been severely depressed due to her social stressors. Reports severe mood swings and racing thoughts. States her fiancé has been calling her crazy. She denies suicidal ideation at this time. She reports poor sleep. She denies hallucinations and paranoia. She is open to medication adjustment.     PSYCHIATRIC HISTORY:    Diagnoses: Depression  Suicide attempts/gestures: Denies   Prior hospitalizations: several  Medication trials: Unable to recall  Mental health contact: Lost to follow-up   Head trauma: Denies     SUBSTANCE USE HISTORY:  Drinks socially. Smokes cannabis daily. Smokes cigarettes. Hospital Course:  Patient was admitted to the behavioral health floor and was acclimated to the unit. She was placed on suicide and fall precautions. Labs were reviewed and physical exam was completed by Dr. Alda Xiao and associates. Home medications were reconciled. CASE was obtained and reviewed. Depakote was started for mood stabilization. Trazodone was given for sleep. Patient tolerated all the medications well and without side effects. HTN was addressed. Follow up with PCP was recommended. With treatment, mood stabilized. Patient attended and participated in groups. All interactions with the peers and staff members were appropriate. Behaviorally, she was not a problem. Sleep and appetite improved. There was no evidence of suicidality. With the above-mentioned medications changes as well as psychotherapeutic interventions, patient reported considerable improvement in her condition and requested discharge home. It was felt that patient was at her baseline. Patient has no access to guns at home. On 5/31/2022 it was therefore decided to discharge the patient, as it was felt that she received maximal benefit from her hospitalization. This patient is not suicidal, homicidal or psychotic at discharge. She does not present danger to self or others.       Number of antipsychotic medication prescribed at discharge: 0  IF MORE THAN ONE IS USED: NA    History of greater than 3 failed multiple monotherapy trials: NA  Monotherapy taper plan/ cross taper in progress: NA  Augmentation of Clozapine: NA    Referral to addiction treatment: patient refused    Prescription for Alcohol or Drug Disorder Medication: patient refused     Prescription for Tobacco Cessation medication: none    If no prescriptions for Tobacco Cessation must document why: patient refused     Consults: Internal medicine    Significant Diagnostic Studies:   Lab Results   Component Value Date    WBC 5.8 05/29/2022    HGB 16.0 05/29/2022    HCT 50.1 (H) 05/29/2022    MCV 87.9 05/29/2022     (H) 05/29/2022     Lab Results   Component Value Date     05/31/2022    K 4.3 05/31/2022    CL 95 (L) 05/31/2022    CO2 34 (H) 05/31/2022    BUN 6 05/31/2022    CREATININE 0.5 05/31/2022    GLUCOSE 91 05/31/2022    CALCIUM 9.5 05/31/2022    PROT 6.2 (L) 05/29/2022    LABALBU 4.1 05/29/2022    BILITOT 0.4 05/29/2022 ALKPHOS 107 (H) 05/29/2022    AST 12 05/29/2022    ALT 9 05/29/2022    LABGLOM >60 05/31/2022    GFRAA >59 05/31/2022         Lab Results   Component Value Date    BXLGEVTT25 493 05/29/2022     Lab Results   Component Value Date    VITD25 41.9 05/29/2022     Lab Results   Component Value Date    CHOL 187 05/29/2022     Lab Results   Component Value Date    TRIG 88 05/29/2022     Lab Results   Component Value Date    HDL 51 (L) 05/29/2022     Lab Results   Component Value Date    LDLCALC 118 05/29/2022     No results found for: LABVLDL, VLDL  No results found for: CHOLHDLRATIO  Lab Results   Component Value Date    LABA1C 5.7 05/29/2022     No results found for: EAG  Lab Results   Component Value Date    TSHFT4 1.63 05/29/2022       Treatments: RN and SW    Mental status examination at the time of discharge:  Alert, Oriented X 4  Appearance:  Improved Hygiene, smiling  Speech with Regular Rate and Rhythm  Eye Contact:  Good  No Psychomotor Agitation/Retardation Noted  Attitude:  Cooperative  Mood: \"So much better! \"  Affective: Congruent, appropriate to the situation, with a normal range and intensity  Thought Processes:  Coherently communicated, logical and goal oriented  Thought Content:  No Suicidal Ideation, No Homicidal Ideation, No Auditory or Visual Hallucinations, NO Overt Delusions  Insight: Improved  Judgement: Improved  Memory is intact for both remote and recent  Intellectual Functioning:  Within the [address] of Knowledge:  Adequate  Attention and Concentration:  Adequate    Discharge Exam:  Please, see medical note    Disposition: home    Patient Instructions:   Current Discharge Medication List      START taking these medications    Details   amLODIPine (NORVASC) 2.5 MG tablet Take 1 tablet by mouth daily for 14 days  Qty: 14 tablet, Refills: 0      divalproex (DEPAKOTE ER) 250 MG extended release tablet Take 3 tablets by mouth at bedtime for 14 days  Qty: 42 tablet, Refills: 0 pantoprazole (PROTONIX) 40 MG tablet Take 1 tablet by mouth every morning (before breakfast) for 14 days  Qty: 14 tablet, Refills: 0      albuterol (PROVENTIL) (2.5 MG/3ML) 0.083% nebulizer solution Take 3 mLs by nebulization 4 times daily as needed for Wheezing or Shortness of Breath  Qty: 120 each, Refills: 0         CONTINUE these medications which have CHANGED    Details   losartan (COZAAR) 100 MG tablet Take 1 tablet by mouth daily for 14 days  Qty: 14 tablet, Refills: 0      traZODone (DESYREL) 50 MG tablet Take 1 tablet by mouth nightly for 14 days  Qty: 14 tablet, Refills: 0         STOP taking these medications       dicyclomine (BENTYL) 20 MG tablet Comments:   Reason for Stopping:         omeprazole (PRILOSEC) 20 MG delayed release capsule Comments:   Reason for Stopping:         loratadine (CLARITIN) 10 MG tablet Comments:   Reason for Stopping:         QUEtiapine (SEROQUEL) 25 MG tablet Comments:   Reason for Stopping:         FLUoxetine (PROZAC) 40 MG capsule Comments:   Reason for Stopping:         QUEtiapine (SEROQUEL) 25 MG tablet Comments:   Reason for Stopping:         vitamin D (ERGOCALCIFEROL) 1.25 MG (98691 UT) CAPS capsule Comments:   Reason for Stopping:         sulfaSALAzine (AZULFIDINE) 500 MG tablet Comments:   Reason for Stopping:               Activity: as tolerated  Diet: low sodium  Wound Care: none needed    Follow-up:  Follow up with PCP in 2 week(s)  on follow up with PCP, please review labs/imaging done during this Hospital stay, and discuss any additional/repeat testing or treatment needed with your PCP     Jun6 Go to [address] [place] [address]  Monday Jun 6, 2022  Therapy follow-up with Abbey VALDES at 1:00 PM. Please bring photo ID and insurance card. Pedro Issa.  [facility]   [address] [place] [address], [address] [facility]   Phone [phone]   Fax [phone]   CRISIS LINE [phone]         Jun7 Go to [facility]  Tuesday Jun 7, 2022  Med management appt with Sharyn at 10:00 AM. Please bring updated med list.  Alfred Dee.  [facility]   [address] [facility], [address] [facility]   Phone [phone]   Fax [phone]   CRISIS LINE [phone]         Time worked: 33 min    Participation: good    Electronically signed by Gautam Marinelli MD on 5/31/2022 at 9:02 AM

## 2022-05-31 NOTE — PROGRESS NOTES
Behavioral Health   Discharge Note  Bridge Appointment completed: Reviewed Discharge Instructions with patient. Patient verbalizes understanding and agreement with the discharge plan using the teachback method. Referral for Outpatient Tobacco Cessation Counseling, upon discharge (doyle X if applicable and completed): (x )  Hospital staff assisted patient to call Quit Line or faxed referral                                   during hospitalization                  ( x)  Recognizing danger situations (included triggers and roadblocks), if not completed on admission                    (x )  Coping skills (new ways to manage stress, exercise, relaxation techniques, changing routine, distraction), if not completed on admission                                                           (x )  Basic information about quitting (benefits of quitting, techniques in how to quit, available resources, if not completed on admission  ( ) Referral for counseling faxed to [facility]   (x ) Patient refused referral  (x ) Patient refused counseling  (x ) Patient refused smoking cessation medication upon discharge    Vaccinations (doyle X if applicable and completed): (x ) Patient states already received influenza vaccine elsewhere  ( ) Patient received influenza vaccine during this hospitalization  ( ) Patient refused influenza vaccine at this time      Pt discharged with followings belongings:   Dental Appliances: None  Vision - Corrective Lenses: None  Hearing Aid: None  Jewelry: None  Body Piercings Removed: N/A  Clothing: Slippers,Shirt,Pants  Other Valuables:  (none)   Valuables sent home with patient. Valuables retrieved from safe and returned to patient. Patient left department with   via  , discharged to  . Patient education on aftercare instructions: explored and reviewed with patient. Patient verbalize understanding of AVS:  yes. Suicidal Ideations? No AVH? Denies HI?  Negative for homicidal ideation Status EXAM upon discharge:  Mental Status and Behavioral Exam  Normal: No  Level of Assistance: Independent/Self  Facial Expression: Exaggerated  Affect: Appropriate  Level of Consciousness: Alert  Frequency of Checks: 4 times per hour, close  Mood:Normal: No  Mood: Depressed,Anxious  Motor Activity:Normal: No  Motor Activity: Decreased  Eye Contact: Good  Observed Behavior: Cooperative  Sexual Misconduct History: Current - no  Preception: [place] to person,[place] to time,[place] to place,[place] to situation  Attention:Normal: No  Attention: Unable to concentrate  Thought Processes: Circumstantial  Thought Content:Normal: No  Thought Content: Preoccupations  Depression Symptoms: Change in energy level,Impaired concentration,Feelings of hopelessess  Anxiety Symptoms: Generalized  [place] Symptoms: Pressured speech  Hallucinations: None  Delusions: No  Delusions: Paranoid  Memory:Normal: No  Memory: Poor recent  Insight and Judgment: No  Insight and Judgment: Poor judgment,Poor insight

## 2022-05-31 NOTE — PROGRESS NOTES
CSW acknowledges that the patients psychosocial and lifetime CSSR-S was completed. CSW reviewed lifetime C-SSRS and psychosocial. CSW has participated in treatment team and discharge planning for patient.          Electronically signed by IDALIA Tran on 5/31/2022 at 4:15 PM

## 2022-05-31 NOTE — DISCHARGE INSTR - DIET
Good nutrition is important when healing from an illness, injury, or surgery. Follow any nutrition recommendations given to you during your hospital stay. If you were given an oral nutrition supplement while in the hospital, continue to take this supplement at home. You can take it with meals, in-between meals, and/or before bedtime. These supplements can be purchased at most local grocery stores, pharmacies, and chain [employer]-stores. If you have any questions about your diet or nutrition, call the hospital and ask for the dietitian. Eating Healthy Foods: Care Instructions  Your Care Instructions     Eating healthy foods can help lower your risk for disease. Healthy food gives you energy and keeps your heart strong, your brain active, your musclesworking, and your bones strong. A healthy diet includes a variety of foods from the basic food groups: grains, vegetables, fruits, milk and milk products, and meat and beans. Some people may eat more of their favorite foods from only one food group and, as a result, miss getting the nutrients they need. So, it is important to pay attention not only to what you eat but also to what you are missing from your diet. You caneat a healthy, balanced diet by making a few small changes. Follow-up care is a key part of your treatment and safety. Be sure to make and go to all appointments, and call your doctor if you are having problems. It's also a good idea to know your test results and keep alist of the medicines you take. How can you care for yourself at home? Look at what you eat  Keep a food diary for a week or two and record everything you eat or drink. Track the number of servings you eat from each food group. For a balanced diet every day, eat a variety of:  6 or more ounce-equivalents of grains, such as cereals, breads, crackers, rice, or pasta, every day.  An ounce-equivalent is 1 slice of bread, 1 cup of ready-to-eat cereal, or ½ cup of cooked rice, cooked pasta, or cooked cereal.  2½ cups of vegetables, especially:  Dark-green vegetables such as broccoli and spinach. Orange vegetables such as carrots and sweet potatoes. Dry beans (such as solomon and kidney beans) and peas (such as lentils). 2 cups of fresh, frozen, or canned fruit. A small apple or 1 banana or orange equals 1 cup.  3 cups of nonfat or low-fat milk, yogurt, or other milk products. 5½ ounces of meat and beans, such as chicken, fish, lean meat, beans, nuts, and seeds. One egg, 1 tablespoon of peanut butter, ½ ounce nuts or seeds, or ¼ cup of cooked beans equals 1 ounce of meat. Learn how to read food labels for serving sizes and ingredients. Fast-food and convenience-food meals often contain few or no fruits or vegetables. Make sure you eat some fruits and vegetables to make the meal more nutritious. Look at your food diary. For each food group, add up what you have eaten and then divide the total by the number of days. This will give you an idea of how much you are eating from each food group. See if you can find some ways to change your diet to make it more healthy. Start small  Do not try to make dramatic changes to your diet all at once. You might feel that you are missing out on your favorite foods and then be more likely to fail. Start slowly, and gradually change your habits. Try some of the following:  Use whole wheat bread instead of white bread. Use nonfat or low-fat milk instead of whole milk. Eat brown rice instead of white rice, and eat whole wheat pasta instead of white-flour pasta. Try low-fat cheeses and low-fat yogurt. Add more fruits and vegetables to meals and have them for snacks. Add lettuce, tomato, cucumber, and onion to sandwiches. Add fruit to yogurt and cereal.  Enjoy food  You can still eat your favorite foods. You just may need to eat less of them.  If your favorite foods are high in fat, salt, and sugar, limit how often you eat them, but do not cut them out entirely. Eat a wide variety of foods. Make healthy choices when eating out  The type of restaurant you choose can help you make healthy choices. Even fast-food chains are now offering more low-fat or healthier choices on the menu. Choose smaller portions, or take half of your meal home. When eating out, try:  A veggie pizza with a whole wheat crust or grilled chicken (instead of sausage or pepperoni). Pasta with roasted vegetables, grilled chicken, or marinara sauce instead of cream sauce. A vegetable wrap or grilled chicken wrap. Broiled or poached food instead of fried or breaded items. Make healthy choices easy  Buy packaged, prewashed, ready-to-eat fresh vegetables and fruits, such as baby carrots, salad mixes, and chopped or shredded broccoli and cauliflower. Buy packaged, presliced fruits, such as melon or pineapple. Choose 100% fruit or vegetable juice instead of soda. Limit juice intake to 4 to 6 oz (½ to ¾ cup) a day. Blend low-fat yogurt, fruit juice, and canned or frozen fruit to make a smoothie for breakfast or a snack. Where can you learn more? Go to https://[employer]web.healthMagine. org and sign in to your [employer] account. Enter K708 in the [facility] box to learn more about \"Eating Healthy Foods: Care Instructions. \"     If you do not have an account, please click on the \"Sign Up Now\" link. Current as of: September 8, 2021               Content Version: 13.2  © 2006-2022 Healthwise, Incorporated. Care instructions adapted under license by [facility] ([facility]). If you have questions about a medical condition or this instruction, always ask your healthcare professional. [place] [address] any warranty or liability for your use of this information.

## 2022-06-01 NOTE — PROGRESS NOTES
Progress Note  Stacy Arzola [place]  5/31/2022 11:56 PM  Subjective:   Admit Date:   5/28/2022      CC/ADMIT DX:       Interval History:   Reviewed overnight events and nursing notes. She denies any physical complaints. I have reviewed all labs/diagnostics from the last 24hrs. ROS:   I have done a 10 point ROS and all are negative, except what is mentioned in the HPI. No diet orders on file    Medications:             Objective:   Vitals: BP (!) 158/90   Pulse 75   Temp (!) 96.5 °F (35.8 °C) (Temporal)   Resp 20   Ht 5' 5\" (1.651 m)   Wt 122 lb 12.8 oz (55.7 kg)   SpO2 95%   BMI 20.43 kg/m²    No intake or output data in the 24 hours ending 05/31/22 1976  General appearance: alert and cooperative with exam  Extremities: extremities normal, atraumatic, no cyanosis or edema  Neurologic:  No obvious focal neurologic deficits. Skin: no rashes    Assessment and Plan:   Principal Problem (Resolved):    Acute psychosis (HCC)  Active Problems:    Persistent mood (affective) disorder, unspecified (HCC)    Anxiety disorder, unspecified    Insomnia, unspecified    Cannabis use disorder, severe, dependence (HCC)    Tobacco use disorder     HTN    H/O Hyponatremia    Plan:  1. Continue present medication(s)   2. Follow BP   3. Follow with Psych      Discharge planning:   her home     Reviewed treatment plans with the patient and/or family.              Electronically signed by Brenda Maurer MD on 5/31/2022 at 11:56 PM

## 2025-08-22 ENCOUNTER — TRANSCRIBE ORDERS (OUTPATIENT)
Dept: ADMINISTRATIVE | Facility: HOSPITAL | Age: 60
End: 2025-08-22

## 2025-08-22 DIAGNOSIS — C50.812 MALIGNANT NEOPLASM OF OVERLAPPING SITES OF LEFT FEMALE BREAST, UNSPECIFIED ESTROGEN RECEPTOR STATUS: Primary | ICD-10-CM

## 2025-08-26 ENCOUNTER — TRANSCRIBE ORDERS (OUTPATIENT)
Dept: ADMINISTRATIVE | Age: 60
End: 2025-08-26

## 2025-08-26 DIAGNOSIS — C50.812 MALIGNANT NEOPLASM OF OVERLAPPING SITES OF LEFT BREAST IN FEMALE, ESTROGEN RECEPTOR NEGATIVE (HCC): Primary | ICD-10-CM

## 2025-08-26 DIAGNOSIS — Z17.1 MALIGNANT NEOPLASM OF OVERLAPPING SITES OF LEFT BREAST IN FEMALE, ESTROGEN RECEPTOR NEGATIVE (HCC): Primary | ICD-10-CM

## 2025-08-26 DIAGNOSIS — Z01.818 EXAMINATION PRIOR TO CHEMOTHERAPY: ICD-10-CM

## 2025-08-28 ENCOUNTER — HOSPITAL ENCOUNTER (OUTPATIENT)
Dept: MRI IMAGING | Age: 60
Discharge: HOME | End: 2025-08-28
Attending: SURGERY
Payer: MEDICARE

## 2025-08-28 ENCOUNTER — HOSPITAL ENCOUNTER (OUTPATIENT)
Age: 60
End: 2025-08-28
Attending: SURGERY
Payer: MEDICARE

## 2025-08-28 VITALS
WEIGHT: 175 LBS | HEIGHT: 60 IN | SYSTOLIC BLOOD PRESSURE: 161 MMHG | BODY MASS INDEX: 34.36 KG/M2 | DIASTOLIC BLOOD PRESSURE: 94 MMHG

## 2025-08-28 DIAGNOSIS — Z01.818 EXAMINATION PRIOR TO CHEMOTHERAPY: ICD-10-CM

## 2025-08-28 DIAGNOSIS — C50.812 MALIGNANT NEOPLASM OF OVERLAPPING SITES OF LEFT BREAST IN FEMALE, ESTROGEN RECEPTOR NEGATIVE (HCC): ICD-10-CM

## 2025-08-28 DIAGNOSIS — Z17.1 MALIGNANT NEOPLASM OF OVERLAPPING SITES OF LEFT BREAST IN FEMALE, ESTROGEN RECEPTOR NEGATIVE (HCC): ICD-10-CM

## 2025-08-28 LAB
CREAT SERPL-MCNC: 0.5 MG/DL (ref 0.3–1.3)
ECHO AO ASC DIAM: 3 CM
ECHO AO ASCENDING AORTA INDEX: 1.7 CM/M2
ECHO AO ROOT DIAM: 2.7 CM
ECHO AO ROOT INDEX: 1.53 CM/M2
ECHO AV AREA PEAK VELOCITY: 2.8 CM2
ECHO AV AREA VTI: 2.5 CM2
ECHO AV AREA/BSA PEAK VELOCITY: 1.6 CM2/M2
ECHO AV AREA/BSA VTI: 1.4 CM2/M2
ECHO AV MEAN GRADIENT: 5 MMHG
ECHO AV MEAN VELOCITY: 1 M/S
ECHO AV PEAK GRADIENT: 9 MMHG
ECHO AV PEAK VELOCITY: 1.5 M/S
ECHO AV VELOCITY RATIO: 0.93
ECHO AV VTI: 29 CM
ECHO BSA: 1.83 M2
ECHO EST RA PRESSURE: 3 MMHG
ECHO IVC PROX: 2 CM
ECHO LA AREA 2C: 13.9 CM2
ECHO LA AREA 4C: 16 CM2
ECHO LA DIAMETER INDEX: 2.27 CM/M2
ECHO LA DIAMETER: 4 CM
ECHO LA MAJOR AXIS: 5.1 CM
ECHO LA MINOR AXIS: 4.7 CM
ECHO LA TO AORTIC ROOT RATIO: 1.48
ECHO LA VOL BP: 37 ML (ref 22–52)
ECHO LA VOL MOD A2C: 34 ML (ref 22–52)
ECHO LA VOL MOD A4C: 38 ML (ref 22–52)
ECHO LA VOL/BSA BIPLANE: 21 ML/M2 (ref 16–34)
ECHO LA VOLUME INDEX MOD A2C: 19 ML/M2 (ref 16–34)
ECHO LA VOLUME INDEX MOD A4C: 22 ML/M2 (ref 16–34)
ECHO LV E' LATERAL VELOCITY: 5.01 CM/S
ECHO LV EDV A2C: 102 ML
ECHO LV EDV A4C: 97 ML
ECHO LV EDV INDEX A4C: 55 ML/M2
ECHO LV EDV NDEX A2C: 58 ML/M2
ECHO LV EJECTION FRACTION A2C: 65 %
ECHO LV EJECTION FRACTION A4C: 69 %
ECHO LV EJECTION FRACTION BIPLANE: 65 % (ref 55–100)
ECHO LV ESV A2C: 36 ML
ECHO LV ESV A4C: 30 ML
ECHO LV ESV INDEX A2C: 20 ML/M2
ECHO LV ESV INDEX A4C: 17 ML/M2
ECHO LV FRACTIONAL SHORTENING: 37 % (ref 28–44)
ECHO LV GLOBAL LONGITUDINAL STRAIN (GLS): -8.1 %
ECHO LV GLOBAL LONGITUDINAL STRAIN (GLS): -9 %
ECHO LV GLOBAL LONGITUDINAL STRAIN (GLS): -9.2 %
ECHO LV GLOBAL LONGITUDINAL STRAIN (GLS): -9.8 %
ECHO LV INTERNAL DIMENSION DIASTOLE INDEX: 2.44 CM/M2
ECHO LV INTERNAL DIMENSION DIASTOLIC: 4.3 CM (ref 3.9–5.3)
ECHO LV INTERNAL DIMENSION SYSTOLIC INDEX: 1.53 CM/M2
ECHO LV INTERNAL DIMENSION SYSTOLIC: 2.7 CM
ECHO LV IVSD: 1 CM (ref 0.6–0.9)
ECHO LV MASS 2D: 142.5 G (ref 67–162)
ECHO LV MASS INDEX 2D: 81 G/M2 (ref 43–95)
ECHO LV POSTERIOR WALL DIASTOLIC: 1 CM (ref 0.6–0.9)
ECHO LV RELATIVE WALL THICKNESS RATIO: 0.47
ECHO LVOT AREA: 3.1 CM2
ECHO LVOT AV VTI INDEX: 0.8
ECHO LVOT DIAM: 2 CM
ECHO LVOT MEAN GRADIENT: 3 MMHG
ECHO LVOT PEAK GRADIENT: 7 MMHG
ECHO LVOT PEAK GRADIENT: 7 MMHG
ECHO LVOT PEAK VELOCITY: 1.4 M/S
ECHO LVOT STROKE VOLUME INDEX: 41.6 ML/M2
ECHO LVOT SV: 73.2 ML
ECHO LVOT VTI: 23.3 CM
ECHO MV A VELOCITY: 1.03 M/S
ECHO MV E VELOCITY: 0.7 M/S
ECHO MV E/A RATIO: 0.68
ECHO MV E/E' LATERAL: 13.97
ECHO RA AREA 4C: 11.7 CM2
ECHO RA END SYSTOLIC VOLUME APICAL 4 CHAMBER INDEX BSA: 13 ML/M2
ECHO RA VOLUME: 23 ML
ECHO RIGHT VENTRICULAR SYSTOLIC PRESSURE (RVSP): 22 MMHG
ECHO RV BASAL DIMENSION: 3.5 CM
ECHO RV LONGITUDINAL DIMENSION: 7.6 CM
ECHO RV MID DIMENSION: 2.6 CM
ECHO RV TAPSE: 2.6 CM (ref 1.7–?)
ECHO TV REGURGITANT MAX VELOCITY: 2.19 M/S
ECHO TV REGURGITANT PEAK GRADIENT: 19 MMHG
PERFORMED ON: NORMAL

## 2025-08-28 PROCEDURE — 93356 MYOCRD STRAIN IMG SPCKL TRCK: CPT

## 2025-08-28 PROCEDURE — A9577 INJ MULTIHANCE: HCPCS | Performed by: SURGERY

## 2025-08-28 PROCEDURE — 82565 ASSAY OF CREATININE: CPT

## 2025-08-28 PROCEDURE — C8908 MRI W/O FOL W/CONT, BREAST,: HCPCS

## 2025-08-28 PROCEDURE — 6360000004 HC RX CONTRAST MEDICATION: Performed by: SURGERY

## 2025-08-28 RX ADMIN — GADOBENATE DIMEGLUMINE 15 ML: 529 INJECTION, SOLUTION INTRAVENOUS at 12:55

## 2025-08-28 RX ADMIN — SULFUR HEXAFLUORIDE 2 ML: 60.7; .19; .19 INJECTION, POWDER, LYOPHILIZED, FOR SUSPENSION INTRAVENOUS; INTRAVESICAL at 15:04

## 2025-08-29 ENCOUNTER — HOSPITAL ENCOUNTER (OUTPATIENT)
Dept: NUCLEAR MEDICINE | Age: 60
Discharge: HOME | End: 2025-08-31
Attending: SURGERY
Payer: MEDICARE

## 2025-08-29 ENCOUNTER — HOSPITAL ENCOUNTER (OUTPATIENT)
Dept: CT IMAGING | Age: 60
Discharge: HOME | End: 2025-08-29
Attending: SURGERY
Payer: MEDICARE

## 2025-08-29 DIAGNOSIS — Z17.1 MALIGNANT NEOPLASM OF OVERLAPPING SITES OF LEFT BREAST IN FEMALE, ESTROGEN RECEPTOR NEGATIVE (HCC): ICD-10-CM

## 2025-08-29 DIAGNOSIS — C50.812 MALIGNANT NEOPLASM OF OVERLAPPING SITES OF LEFT BREAST IN FEMALE, ESTROGEN RECEPTOR NEGATIVE (HCC): ICD-10-CM

## 2025-08-29 PROCEDURE — 6360000004 HC RX CONTRAST MEDICATION: Performed by: SURGERY

## 2025-08-29 PROCEDURE — 71260 CT THORAX DX C+: CPT

## 2025-08-29 PROCEDURE — 3430000000 HC RX DIAGNOSTIC RADIOPHARMACEUTICAL: Performed by: SURGERY

## 2025-08-29 PROCEDURE — 78306 BONE IMAGING WHOLE BODY: CPT | Performed by: SURGERY

## 2025-08-29 PROCEDURE — A9503 TC99M MEDRONATE: HCPCS | Performed by: SURGERY

## 2025-08-29 PROCEDURE — 74177 CT ABD & PELVIS W/CONTRAST: CPT

## 2025-08-29 RX ORDER — IOPAMIDOL 755 MG/ML
75 INJECTION, SOLUTION INTRAVASCULAR
Status: COMPLETED | OUTPATIENT
Start: 2025-08-29 | End: 2025-08-29

## 2025-08-29 RX ORDER — TC 99M MEDRONATE 20 MG/10ML
20 INJECTION, POWDER, LYOPHILIZED, FOR SOLUTION INTRAVENOUS
Status: COMPLETED | OUTPATIENT
Start: 2025-08-29 | End: 2025-08-29

## 2025-08-29 RX ADMIN — IOPAMIDOL 75 ML: 755 INJECTION, SOLUTION INTRAVENOUS at 09:52

## 2025-08-29 RX ADMIN — TC 99M MEDRONATE 20 MILLICURIE: 20 INJECTION, POWDER, LYOPHILIZED, FOR SOLUTION INTRAVENOUS at 14:14

## 2025-09-04 LAB
ECHO AO ASC DIAM: 3 CM
ECHO AO ASCENDING AORTA INDEX: 1.7 CM/M2
ECHO AO ROOT DIAM: 2.7 CM
ECHO AO ROOT INDEX: 1.53 CM/M2
ECHO AV AREA PEAK VELOCITY: 2.8 CM2
ECHO AV AREA VTI: 2.5 CM2
ECHO AV AREA/BSA PEAK VELOCITY: 1.6 CM2/M2
ECHO AV AREA/BSA VTI: 1.4 CM2/M2
ECHO AV MEAN GRADIENT: 5 MMHG
ECHO AV MEAN VELOCITY: 1 M/S
ECHO AV PEAK GRADIENT: 9 MMHG
ECHO AV PEAK VELOCITY: 1.5 M/S
ECHO AV VELOCITY RATIO: 0.93
ECHO AV VTI: 29 CM
ECHO BSA: 1.83 M2
ECHO EST RA PRESSURE: 3 MMHG
ECHO IVC PROX: 2 CM
ECHO LA AREA 2C: 13.9 CM2
ECHO LA AREA 4C: 16 CM2
ECHO LA DIAMETER INDEX: 2.27 CM/M2
ECHO LA DIAMETER: 4 CM
ECHO LA MAJOR AXIS: 5.1 CM
ECHO LA MINOR AXIS: 4.7 CM
ECHO LA TO AORTIC ROOT RATIO: 1.48
ECHO LA VOL BP: 37 ML (ref 22–52)
ECHO LA VOL MOD A2C: 34 ML (ref 22–52)
ECHO LA VOL MOD A4C: 38 ML (ref 22–52)
ECHO LA VOL/BSA BIPLANE: 21 ML/M2 (ref 16–34)
ECHO LA VOLUME INDEX MOD A2C: 19 ML/M2 (ref 16–34)
ECHO LA VOLUME INDEX MOD A4C: 22 ML/M2 (ref 16–34)
ECHO LV E' LATERAL VELOCITY: 5.01 CM/S
ECHO LV EDV A2C: 102 ML
ECHO LV EDV A4C: 97 ML
ECHO LV EDV INDEX A4C: 55 ML/M2
ECHO LV EDV NDEX A2C: 58 ML/M2
ECHO LV EF PHYSICIAN: 60 %
ECHO LV EJECTION FRACTION A2C: 65 %
ECHO LV EJECTION FRACTION A4C: 69 %
ECHO LV EJECTION FRACTION BIPLANE: 65 % (ref 55–100)
ECHO LV ESV A2C: 36 ML
ECHO LV ESV A4C: 30 ML
ECHO LV ESV INDEX A2C: 20 ML/M2
ECHO LV ESV INDEX A4C: 17 ML/M2
ECHO LV FRACTIONAL SHORTENING: 37 % (ref 28–44)
ECHO LV GLOBAL LONGITUDINAL STRAIN (GLS): -8.1 %
ECHO LV GLOBAL LONGITUDINAL STRAIN (GLS): -9 %
ECHO LV GLOBAL LONGITUDINAL STRAIN (GLS): -9.2 %
ECHO LV GLOBAL LONGITUDINAL STRAIN (GLS): -9.8 %
ECHO LV INTERNAL DIMENSION DIASTOLE INDEX: 2.44 CM/M2
ECHO LV INTERNAL DIMENSION DIASTOLIC: 4.3 CM (ref 3.9–5.3)
ECHO LV INTERNAL DIMENSION SYSTOLIC INDEX: 1.53 CM/M2
ECHO LV INTERNAL DIMENSION SYSTOLIC: 2.7 CM
ECHO LV IVSD: 1 CM (ref 0.6–0.9)
ECHO LV MASS 2D: 142.5 G (ref 67–162)
ECHO LV MASS INDEX 2D: 81 G/M2 (ref 43–95)
ECHO LV POSTERIOR WALL DIASTOLIC: 1 CM (ref 0.6–0.9)
ECHO LV RELATIVE WALL THICKNESS RATIO: 0.47
ECHO LVOT AREA: 3.1 CM2
ECHO LVOT AV VTI INDEX: 0.8
ECHO LVOT DIAM: 2 CM
ECHO LVOT MEAN GRADIENT: 3 MMHG
ECHO LVOT PEAK GRADIENT: 7 MMHG
ECHO LVOT PEAK GRADIENT: 7 MMHG
ECHO LVOT PEAK VELOCITY: 1.4 M/S
ECHO LVOT STROKE VOLUME INDEX: 41.6 ML/M2
ECHO LVOT SV: 73.2 ML
ECHO LVOT VTI: 23.3 CM
ECHO MV A VELOCITY: 1.03 M/S
ECHO MV E VELOCITY: 0.7 M/S
ECHO MV E/A RATIO: 0.68
ECHO MV E/E' LATERAL: 13.97
ECHO RA AREA 4C: 11.7 CM2
ECHO RA END SYSTOLIC VOLUME APICAL 4 CHAMBER INDEX BSA: 13 ML/M2
ECHO RA VOLUME: 23 ML
ECHO RIGHT VENTRICULAR SYSTOLIC PRESSURE (RVSP): 22 MMHG
ECHO RV BASAL DIMENSION: 3.5 CM
ECHO RV LONGITUDINAL DIMENSION: 7.6 CM
ECHO RV MID DIMENSION: 2.6 CM
ECHO RV TAPSE: 2.6 CM (ref 1.7–?)
ECHO TV REGURGITANT MAX VELOCITY: 2.19 M/S
ECHO TV REGURGITANT PEAK GRADIENT: 19 MMHG

## 2025-09-05 ENCOUNTER — TELEPHONE (OUTPATIENT)
Dept: HEMATOLOGY | Age: 60
End: 2025-09-05